# Patient Record
Sex: MALE | Race: WHITE | NOT HISPANIC OR LATINO | Employment: OTHER | URBAN - METROPOLITAN AREA
[De-identification: names, ages, dates, MRNs, and addresses within clinical notes are randomized per-mention and may not be internally consistent; named-entity substitution may affect disease eponyms.]

---

## 2017-09-14 ENCOUNTER — OFFICE VISIT (OUTPATIENT)
Dept: INTERNAL MEDICINE | Facility: CLINIC | Age: 62
End: 2017-09-14
Payer: COMMERCIAL

## 2017-09-14 VITALS
WEIGHT: 188.06 LBS | DIASTOLIC BLOOD PRESSURE: 90 MMHG | HEIGHT: 73 IN | OXYGEN SATURATION: 95 % | SYSTOLIC BLOOD PRESSURE: 122 MMHG | BODY MASS INDEX: 24.92 KG/M2 | HEART RATE: 78 BPM

## 2017-09-14 DIAGNOSIS — J01.40 ACUTE PANSINUSITIS, RECURRENCE NOT SPECIFIED: Primary | ICD-10-CM

## 2017-09-14 DIAGNOSIS — R05.9 COUGH: ICD-10-CM

## 2017-09-14 DIAGNOSIS — R09.81 NASAL CONGESTION: ICD-10-CM

## 2017-09-14 PROCEDURE — 99213 OFFICE O/P EST LOW 20 MIN: CPT | Mod: 25,S$GLB,, | Performed by: NURSE PRACTITIONER

## 2017-09-14 PROCEDURE — 3008F BODY MASS INDEX DOCD: CPT | Mod: S$GLB,,, | Performed by: NURSE PRACTITIONER

## 2017-09-14 PROCEDURE — 96372 THER/PROPH/DIAG INJ SC/IM: CPT | Mod: S$GLB,,, | Performed by: NURSE PRACTITIONER

## 2017-09-14 PROCEDURE — 99999 PR PBB SHADOW E&M-EST. PATIENT-LVL III: CPT | Mod: PBBFAC,,, | Performed by: NURSE PRACTITIONER

## 2017-09-14 RX ORDER — AMOXICILLIN AND CLAVULANATE POTASSIUM 875; 125 MG/1; MG/1
1 TABLET, FILM COATED ORAL EVERY 12 HOURS
Qty: 20 TABLET | Refills: 0 | Status: SHIPPED | OUTPATIENT
Start: 2017-09-14 | End: 2017-09-24

## 2017-09-14 RX ORDER — BENZONATATE 100 MG/1
100 CAPSULE ORAL 3 TIMES DAILY PRN
Qty: 20 CAPSULE | Refills: 0 | Status: SHIPPED | OUTPATIENT
Start: 2017-09-14 | End: 2017-09-24

## 2017-09-14 RX ORDER — BETAMETHASONE SODIUM PHOSPHATE AND BETAMETHASONE ACETATE 3; 3 MG/ML; MG/ML
6 INJECTION, SUSPENSION INTRA-ARTICULAR; INTRALESIONAL; INTRAMUSCULAR; SOFT TISSUE
Status: COMPLETED | OUTPATIENT
Start: 2017-09-14 | End: 2017-09-14

## 2017-09-14 RX ADMIN — BETAMETHASONE SODIUM PHOSPHATE AND BETAMETHASONE ACETATE 6 MG: 3; 3 INJECTION, SUSPENSION INTRA-ARTICULAR; INTRALESIONAL; INTRAMUSCULAR; SOFT TISSUE at 01:09

## 2017-09-14 NOTE — PROGRESS NOTES
INTERNAL MEDICINE PROGRESS/URGENT CARE NOTE    CHIEF COMPLAINT     Chief Complaint   Patient presents with    Cough     dry cough    Hearing Problem     both mostly R. ear        HPI     Marbin Cortes is a 62 y.o. male who presents for an urgent visit today.    2 week h/o ear pressure, headaches, sinus pain and pressure. Dry cough with PND.   +muffled hearing  +runny nose  +congestion   +fatigue  +sore throat  +sob and wheezing   +fatigue     Treating with saline nasal spray with mild relief.         Answers for HPI/ROS submitted by the patient on 9/14/2017   Cough  Chronicity: recurrent  Onset: yesterday  Progression since onset: unchanged  Frequency: every few minutes  Cough characteristics: non-productive  ear congestion: Yes  nasal congestion: Yes  Aggravated by: nothing, dust  asthma: No  bronchiectasis: No  bronchitis: Yes  COPD: No  emphysema: No  pneumonia: No  Treatments tried: OTC cough suppressant, rest  Improvement on treatment: no relief      Past Medical History:  Past Medical History:   Diagnosis Date    GERD (gastroesophageal reflux disease)        Home Medications:  Prior to Admission medications    Medication Sig Start Date End Date Taking? Authorizing Provider   ibuprofen (ADVIL) 200 MG tablet Take 200 mg by mouth every 6 (six) hours as needed for Pain.   Yes Historical Provider, MD   cetirizine (ZYRTEC) 5 MG tablet Take 5 mg by mouth once daily.  9/14/17 Yes Historical Provider, MD   tamsulosin (FLOMAX) 0.4 mg Cp24 Take 1 capsule (0.4 mg total) by mouth once daily. 6/27/16 6/27/17  Seng Winston MD   fluticasone (FLONASE) 50 mcg/actuation nasal spray 2 sprays by Each Nare route once daily. 5/31/16 9/14/17  Ga Valdivia MD   omeprazole (PRILOSEC OTC) 20 MG tablet Take 20 mg by mouth once daily.  9/14/17  Historical Provider, MD       Review of Systems:  Review of Systems   Constitutional: Negative for chills, fatigue and fever.   HENT: Positive for ear pain, hearing loss,  "postnasal drip, rhinorrhea, sinus pain, sinus pressure and sore throat. Negative for congestion and sneezing.    Respiratory: Positive for cough, shortness of breath and wheezing.    Cardiovascular: Negative for chest pain and palpitations.   Gastrointestinal: Negative for abdominal pain, diarrhea, nausea and vomiting.   Allergic/Immunologic: Positive for environmental allergies.       Health Maintainence:   Immunizations:  Health Maintenance       Date Due Completion Date    Hepatitis C Screening 1955 ---    TETANUS VACCINE 08/15/1974 ---    Colonoscopy 08/15/2006 ---    Zoster Vaccine 08/15/2016 ---    Influenza Vaccine 08/01/2017 ---    Lipid Panel 06/03/2021 6/3/2016           PHYSICAL EXAM     BP (!) 122/90 (BP Location: Left arm, Patient Position: Sitting, BP Method: Large (Manual))   Pulse 78   Ht 6' 1" (1.854 m)   Wt 85.3 kg (188 lb 0.8 oz)   SpO2 95%   BMI 24.81 kg/m²     Physical Exam   Constitutional: He is oriented to person, place, and time. He appears well-developed and well-nourished.   HENT:   Head: Normocephalic.   Right Ear: External ear normal. Tympanic membrane is erythematous.   Left Ear: External ear normal. Tympanic membrane is erythematous.   Nose: Mucosal edema present. Right sinus exhibits maxillary sinus tenderness and frontal sinus tenderness. Left sinus exhibits maxillary sinus tenderness and frontal sinus tenderness.   Mouth/Throat: Oropharynx is clear and moist. No oropharyngeal exudate.   Eyes: Pupils are equal, round, and reactive to light.   Neck: No JVD present. No tracheal deviation present. No thyromegaly present.   Cardiovascular: Normal rate, regular rhythm and intact distal pulses.  Exam reveals no gallop and no friction rub.    No murmur heard.  Pulmonary/Chest: Breath sounds normal. No respiratory distress. He has no wheezes. He has no rales. He exhibits no tenderness.   Abdominal: Soft. Bowel sounds are normal. He exhibits no distension. There is no tenderness. "   Musculoskeletal: Normal range of motion. He exhibits no edema or tenderness.   Lymphadenopathy:     He has no cervical adenopathy.   Neurological: He is alert and oriented to person, place, and time.   Skin: Skin is warm and dry. No rash noted.   Psychiatric: He has a normal mood and affect. His behavior is normal.   Vitals reviewed.      LABS     No results found for: LABA1C, HGBA1C  CMP  Sodium   Date Value Ref Range Status   06/03/2016 137 136 - 145 mmol/L Final     Potassium   Date Value Ref Range Status   06/03/2016 4.7 3.5 - 5.1 mmol/L Final     Chloride   Date Value Ref Range Status   06/03/2016 101 95 - 110 mmol/L Final     CO2   Date Value Ref Range Status   06/03/2016 30 (H) 23 - 29 mmol/L Final     Glucose   Date Value Ref Range Status   06/03/2016 106 70 - 110 mg/dL Final     BUN, Bld   Date Value Ref Range Status   06/03/2016 16 6 - 20 mg/dL Final     Creatinine   Date Value Ref Range Status   06/03/2016 1.2 0.5 - 1.4 mg/dL Final     Calcium   Date Value Ref Range Status   06/03/2016 9.5 8.7 - 10.5 mg/dL Final     Anion Gap   Date Value Ref Range Status   06/03/2016 6 (L) 8 - 16 mmol/L Final     eGFR if    Date Value Ref Range Status   06/03/2016 >60.0 >60 mL/min/1.73 m^2 Final     eGFR if non    Date Value Ref Range Status   06/03/2016 >60.0 >60 mL/min/1.73 m^2 Final     Comment:     Calculation used to obtain the estimated glomerular filtration  rate (eGFR) is the CKD-EPI equation. Since race is unknown   in our information system, the eGFR values for   -American and Non--American patients are given   for each creatinine result.       Lab Results   Component Value Date    WBC 6.16 06/03/2016    HGB 15.5 06/03/2016    HCT 46.3 06/03/2016    MCV 86 06/03/2016     06/03/2016     Lab Results   Component Value Date    CHOL 199 06/03/2016     Lab Results   Component Value Date    HDL 43 06/03/2016     Lab Results   Component Value Date    LDLCALC 128.2  06/03/2016     Lab Results   Component Value Date    TRIG 139 06/03/2016     Lab Results   Component Value Date    CHOLHDL 21.6 06/03/2016     No results found for: TSH, C7VKATP, F8PGXQC, THYROIDAB    ASSESSMENT/PLAN     Marbin Cortes is a 62 y.o. male with  Past Medical History:   Diagnosis Date    GERD (gastroesophageal reflux disease)        Acute pansinusitis, recurrence not specified- increase fluids and rest. mucinex ith full glass of water   -     betamethasone acetate-betamethasone sodium phosphate injection 6 mg; Inject 1 mL (6 mg total) into the muscle one time.  -     amoxicillin-clavulanate 875-125mg (AUGMENTIN) 875-125 mg per tablet; Take 1 tablet by mouth every 12 (twelve) hours.  Dispense: 20 tablet; Refill: 0  -     benzonatate (TESSALON) 100 MG capsule; Take 1 capsule (100 mg total) by mouth 3 (three) times daily as needed for Cough.  Dispense: 20 capsule; Refill: 0    Cough  -     benzonatate (TESSALON) 100 MG capsule; Take 1 capsule (100 mg total) by mouth 3 (three) times daily as needed for Cough.  Dispense: 20 capsule; Refill: 0    Nasal congestion  -     betamethasone acetate-betamethasone sodium phosphate injection 6 mg; Inject 1 mL (6 mg total) into the muscle one time.      Follow up as needed     Patient education provided from Anni. Patient was counseled on when and how to seek emergent care.       Dana ESTRADA, APRN, FNP-c   Department of Internal Medicine - [facility]  1:17 PM

## 2017-12-12 ENCOUNTER — TELEPHONE (OUTPATIENT)
Dept: SPINE | Facility: CLINIC | Age: 62
End: 2017-12-12

## 2017-12-12 DIAGNOSIS — M54.50 LOW BACK PAIN WITHOUT SCIATICA, UNSPECIFIED BACK PAIN LATERALITY, UNSPECIFIED CHRONICITY: Primary | ICD-10-CM
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
## 2017-12-13 ENCOUNTER — OFFICE VISIT (OUTPATIENT)
Dept: SPINE | Facility: CLINIC | Age: 62
End: 2017-12-13
Payer: COMMERCIAL

## 2017-12-13 VITALS
HEIGHT: 73 IN | DIASTOLIC BLOOD PRESSURE: 84 MMHG | BODY MASS INDEX: 24.92 KG/M2 | WEIGHT: 188 LBS | HEART RATE: 79 BPM | SYSTOLIC BLOOD PRESSURE: 143 MMHG

## 2017-12-13 DIAGNOSIS — M54.17 THORACIC AND LUMBOSACRAL NEURITIS: ICD-10-CM

## 2017-12-13 DIAGNOSIS — M54.14 THORACIC AND LUMBOSACRAL NEURITIS: ICD-10-CM

## 2017-12-13 DIAGNOSIS — M54.41 ACUTE RIGHT-SIDED LOW BACK PAIN WITH RIGHT-SIDED SCIATICA: ICD-10-CM

## 2017-12-13 DIAGNOSIS — M47.819 SPONDYLOSIS WITHOUT MYELOPATHY: ICD-10-CM

## 2017-12-13 DIAGNOSIS — M51.37 DDD (DEGENERATIVE DISC DISEASE), LUMBOSACRAL: ICD-10-CM

## 2017-12-13 PROCEDURE — 99214 OFFICE O/P EST MOD 30 MIN: CPT | Mod: S$GLB,,, | Performed by: PHYSICIAN ASSISTANT

## 2017-12-13 PROCEDURE — 99999 PR PBB SHADOW E&M-EST. PATIENT-LVL IV: CPT | Mod: PBBFAC,,, | Performed by: PHYSICIAN ASSISTANT

## 2017-12-13 RX ORDER — ACETAMINOPHEN 500 MG
500 TABLET ORAL EVERY 6 HOURS PRN
COMMUNITY

## 2017-12-13 RX ORDER — METHYLPREDNISOLONE 4 MG/1
TABLET ORAL
Qty: 1 PACKAGE | Refills: 0 | Status: SHIPPED | OUTPATIENT
Start: 2017-12-13 | End: 2018-01-03
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
## 2017-12-15 ENCOUNTER — TELEPHONE (OUTPATIENT)
Dept: SPINE | Facility: CLINIC | Age: 62
End: 2017-12-15

## 2017-12-15 NOTE — TELEPHONE ENCOUNTER
----- Message from Liliana Varghese sent at 12/15/2017  9:26 AM CST -----  Contact: Magaly ([facility])  x_  1st Request  _  2nd Request  _  3rd Request    Who: Magaly ([facility])    Why: Magaly would like to speak with staff in reference to the patient's authorization number she states she doesn't see the order for the MRI. She also want's to verify that the MRI is without contrast and not with.     What Number to Call Back: [phone]    When to Expect a call back: (Within 24 hours)    Please return the call at earliest convenience. Thanks!

## 2017-12-15 NOTE — TELEPHONE ENCOUNTER
Called and spoke with Magaly.  Informed her that there is pending authorization with our authorization department.  The MRI is without contrast.

## 2017-12-18 ENCOUNTER — TELEPHONE (OUTPATIENT)
Dept: SPINE | Facility: CLINIC | Age: 62
End: 2017-12-18

## 2017-12-18 NOTE — TELEPHONE ENCOUNTER
----- Message from Liliana Varghese sent at 12/18/2017 10:05 AM CST -----  Contact: MARIELA PHILLIPS [[id number]]  x_  1st Request  _  2nd Request  _  3rd Request    Who: MARIELA PHILLIPS [[id number]]    Why: Patient would like to speak with staff in reference to the methylPREDNISolone (MEDROL, LIZETTE,) medication that was prescribed to him. Pt states that it's not working and feels as if his pain has gotten worse sense. Please call to further discuss and advise.    What Number to Call Back:[phone]    When to Expect a call back: (Within 24 hours)    Please return the call at earliest convenience. Thanks!

## 2017-12-18 NOTE — TELEPHONE ENCOUNTER
Called and spoke with patient.  Verified with Shelly that he was ok to take Tylenol arthritis with the dose pack he is on day 3 and stated that it is not helping much.  He stated that he is now having trouble sleeping.  He will sleep for 2 hours and then be up for an hour. He stated that the leg numbness is bothering him. He stated he wanted to let you know that.

## 2017-12-19 ENCOUNTER — TELEPHONE (OUTPATIENT)
Dept: SPINE | Facility: CLINIC | Age: 62
End: 2017-12-19

## 2017-12-19 ENCOUNTER — TELEPHONE (OUTPATIENT)
Dept: INTERNAL MEDICINE | Facility: CLINIC | Age: 62
End: 2017-12-19

## 2017-12-19 NOTE — TELEPHONE ENCOUNTER
He needs to speak with his PCP about an order for a colonoscopy.    I do not order those in the [facility].    Fouzia Linn, PARRIS, PA-C  Neurosurgery  [facility]  [facility]

## 2017-12-19 NOTE — TELEPHONE ENCOUNTER
----- Message from Chrystal Lowe sent at 12/19/2017  9:07 AM CST -----  Contact: pt   x 1st Request  _ 2nd Request  _ 3rd Request    Who: pt     Why: Pt states that her home stool test was abnormal and is requesting a order for a colonoscopy. Please call and discuss.     What Number to Call Back: [phone]     When to Expect a call back: (Before the end of the day)  -- if call after 3:00 call back will be tomorrow.

## 2017-12-19 NOTE — TELEPHONE ENCOUNTER
Once I see the new MRI I will be able to better determine a plan for him.    He could try gabapentin or lyrica for the leg numbness.    Fouzia Linn, PARRIS, PA-C  Neurosurgery  [facility]  [facility]

## 2017-12-19 NOTE — TELEPHONE ENCOUNTER
Called and left message that the order had been sent and refaxed today.  Patient to call back if anything else is needed.

## 2017-12-19 NOTE — TELEPHONE ENCOUNTER
----- Message from Chrystal Lowe sent at 12/19/2017  9:03 AM CST -----  Contact: pt   X 1st Request  _ 2nd Request  _ 3rd Request    Who: pt     Why: Pt is upset and stating that his MRI order was sent to the wrong facility. Pt states that MRI order is suppose to be sent to [facility] [phone] on [address] [place] [address] . Please call and discuss.     What Number to Call Back: [phone]     When to Expect a call back: (Before the end of the day)  -- if call after 3:00 call back will be tomorrow.

## 2017-12-20 RX ORDER — PREGABALIN 75 MG/1
75 CAPSULE ORAL 2 TIMES DAILY
Qty: 60 CAPSULE | Refills: 2 | Status: SHIPPED | OUTPATIENT
Start: 2017-12-20 | End: 2022-09-01

## 2017-12-20 NOTE — TELEPHONE ENCOUNTER
Called and informed patient that Fouzia suggested medication to help woth the leg numbness.  Patient stated that he tried Gabapentin before but it did not help.  He would like to try the Lyrica.

## 2017-12-20 NOTE — TELEPHONE ENCOUNTER
Phone in [facility] please.    PARRIS Zelaya, PA-C  Neurosurgery  [facility]  [facility]

## 2017-12-21 ENCOUNTER — OFFICE VISIT (OUTPATIENT)
Dept: SPINE | Facility: CLINIC | Age: 62
End: 2017-12-21
Payer: COMMERCIAL

## 2017-12-21 VITALS
WEIGHT: 188 LBS | SYSTOLIC BLOOD PRESSURE: 149 MMHG | HEART RATE: 81 BPM | DIASTOLIC BLOOD PRESSURE: 85 MMHG | BODY MASS INDEX: 24.92 KG/M2 | HEIGHT: 73 IN

## 2017-12-21 DIAGNOSIS — M54.17 THORACIC AND LUMBOSACRAL NEURITIS: ICD-10-CM

## 2017-12-21 DIAGNOSIS — M51.37 DDD (DEGENERATIVE DISC DISEASE), LUMBOSACRAL: ICD-10-CM

## 2017-12-21 DIAGNOSIS — M54.41 ACUTE RIGHT-SIDED LOW BACK PAIN WITH RIGHT-SIDED SCIATICA: ICD-10-CM

## 2017-12-21 DIAGNOSIS — M47.819 SPONDYLOSIS WITHOUT MYELOPATHY: ICD-10-CM

## 2017-12-21 DIAGNOSIS — M54.14 THORACIC AND LUMBOSACRAL NEURITIS: ICD-10-CM

## 2017-12-21 PROCEDURE — 99999 PR PBB SHADOW E&M-EST. PATIENT-LVL III: CPT | Mod: PBBFAC,,, | Performed by: PHYSICIAN ASSISTANT

## 2017-12-21 PROCEDURE — 99214 OFFICE O/P EST MOD 30 MIN: CPT | Mod: S$GLB,,, | Performed by: PHYSICIAN ASSISTANT
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
## 2018-01-14 ENCOUNTER — TELEPHONE (OUTPATIENT)
Dept: INTERNAL MEDICINE | Facility: CLINIC | Age: 63
End: 2018-01-14

## 2018-01-14 DIAGNOSIS — Z00.00 ANNUAL PHYSICAL EXAM: Primary | ICD-10-CM

## 2018-01-14 DIAGNOSIS — N28.1 RENAL CYST: ICD-10-CM

## 2018-01-14 DIAGNOSIS — Z12.5 PROSTATE CANCER SCREENING: ICD-10-CM

## 2018-01-14 NOTE — TELEPHONE ENCOUNTER
See radiology orders and please schedule patient for ordered test - renal ultrasound to follow up on spine service notes. Thank you.    Patient is also due for an annual exam - Please schedule patient for an appointment with me. Thank you.    Please see labs.

## 2018-01-14 NOTE — TELEPHONE ENCOUNTER
----- Message from Fouzia Linn PA-C sent at 12/21/2017 12:50 PM CST -----  Dr. Valdivia,    I just saw Mr. Cortes with an outside MRI and it looks like there is a right kidney cyst.  The radiology report didn't mention anything though.    Just wanted to let you know in case you wanted to do further imaging of his kidneys for further evaluation.    Thanks,  Fouzia Linn, [facility], RENETTA  Neurosurgery  [facility]  [facility]

## 2018-01-23 ENCOUNTER — TELEPHONE (OUTPATIENT)
Dept: INTERNAL MEDICINE | Facility: CLINIC | Age: 63
End: 2018-01-23

## 2018-01-23 NOTE — TELEPHONE ENCOUNTER
Spoke to pt and he stated that he will call at a later date to schedule appointments because his wife just had knee surgery and he needs to care for her.

## 2022-09-01 ENCOUNTER — OFFICE VISIT (OUTPATIENT)
Dept: INTERNAL MEDICINE | Facility: CLINIC | Age: 67
End: 2022-09-01
Payer: MEDICARE

## 2022-09-01 ENCOUNTER — NURSE TRIAGE (OUTPATIENT)
Dept: ADMINISTRATIVE | Facility: CLINIC | Age: 67
End: 2022-09-01
Payer: MEDICARE

## 2022-09-01 VITALS
DIASTOLIC BLOOD PRESSURE: 85 MMHG | HEART RATE: 77 BPM | BODY MASS INDEX: 26.51 KG/M2 | SYSTOLIC BLOOD PRESSURE: 140 MMHG | WEIGHT: 200 LBS | HEIGHT: 73 IN

## 2022-09-01 DIAGNOSIS — S16.1XXA NECK MUSCLE STRAIN, INITIAL ENCOUNTER: Primary | ICD-10-CM

## 2022-09-01 DIAGNOSIS — E66.3 OVERWEIGHT (BMI 25.0-29.9): ICD-10-CM

## 2022-09-01 PROCEDURE — 3077F SYST BP >= 140 MM HG: CPT | Mod: CPTII,S$GLB,, | Performed by: NURSE PRACTITIONER

## 2022-09-01 PROCEDURE — 99214 OFFICE O/P EST MOD 30 MIN: CPT | Mod: S$GLB,,, | Performed by: NURSE PRACTITIONER

## 2022-09-01 PROCEDURE — 1101F PR PT FALLS ASSESS DOC 0-1 FALLS W/OUT INJ PAST YR: ICD-10-PCS | Mod: CPTII,S$GLB,, | Performed by: NURSE PRACTITIONER

## 2022-09-01 PROCEDURE — 1125F PR PAIN SEVERITY QUANTIFIED, PAIN PRESENT: ICD-10-PCS | Mod: CPTII,S$GLB,, | Performed by: NURSE PRACTITIONER

## 2022-09-01 PROCEDURE — 3288F FALL RISK ASSESSMENT DOCD: CPT | Mod: CPTII,S$GLB,, | Performed by: NURSE PRACTITIONER

## 2022-09-01 PROCEDURE — 1160F RVW MEDS BY RX/DR IN RCRD: CPT | Mod: CPTII,S$GLB,, | Performed by: NURSE PRACTITIONER

## 2022-09-01 PROCEDURE — 1160F PR REVIEW ALL MEDS BY PRESCRIBER/CLIN PHARMACIST DOCUMENTED: ICD-10-PCS | Mod: CPTII,S$GLB,, | Performed by: NURSE PRACTITIONER

## 2022-09-01 PROCEDURE — 99999 PR PBB SHADOW E&M-EST. PATIENT-LVL III: CPT | Mod: PBBFAC,,, | Performed by: NURSE PRACTITIONER

## 2022-09-01 PROCEDURE — 1159F MED LIST DOCD IN RCRD: CPT | Mod: CPTII,S$GLB,, | Performed by: NURSE PRACTITIONER

## 2022-09-01 PROCEDURE — 1159F PR MEDICATION LIST DOCUMENTED IN MEDICAL RECORD: ICD-10-PCS | Mod: CPTII,S$GLB,, | Performed by: NURSE PRACTITIONER

## 2022-09-01 PROCEDURE — 99999 PR PBB SHADOW E&M-EST. PATIENT-LVL III: ICD-10-PCS | Mod: PBBFAC,,, | Performed by: NURSE PRACTITIONER

## 2022-09-01 PROCEDURE — 3288F PR FALLS RISK ASSESSMENT DOCUMENTED: ICD-10-PCS | Mod: CPTII,S$GLB,, | Performed by: NURSE PRACTITIONER

## 2022-09-01 PROCEDURE — 3008F BODY MASS INDEX DOCD: CPT | Mod: CPTII,S$GLB,, | Performed by: NURSE PRACTITIONER

## 2022-09-01 PROCEDURE — 99214 PR OFFICE/OUTPT VISIT, EST, LEVL IV, 30-39 MIN: ICD-10-PCS | Mod: S$GLB,,, | Performed by: NURSE PRACTITIONER

## 2022-09-01 PROCEDURE — 3079F PR MOST RECENT DIASTOLIC BLOOD PRESSURE 80-89 MM HG: ICD-10-PCS | Mod: CPTII,S$GLB,, | Performed by: NURSE PRACTITIONER

## 2022-09-01 PROCEDURE — 3008F PR BODY MASS INDEX (BMI) DOCUMENTED: ICD-10-PCS | Mod: CPTII,S$GLB,, | Performed by: NURSE PRACTITIONER

## 2022-09-01 PROCEDURE — 3079F DIAST BP 80-89 MM HG: CPT | Mod: CPTII,S$GLB,, | Performed by: NURSE PRACTITIONER

## 2022-09-01 PROCEDURE — 1125F AMNT PAIN NOTED PAIN PRSNT: CPT | Mod: CPTII,S$GLB,, | Performed by: NURSE PRACTITIONER

## 2022-09-01 PROCEDURE — 1101F PT FALLS ASSESS-DOCD LE1/YR: CPT | Mod: CPTII,S$GLB,, | Performed by: NURSE PRACTITIONER

## 2022-09-01 PROCEDURE — 3077F PR MOST RECENT SYSTOLIC BLOOD PRESSURE >= 140 MM HG: ICD-10-PCS | Mod: CPTII,S$GLB,, | Performed by: NURSE PRACTITIONER

## 2022-09-01 RX ORDER — METHOCARBAMOL 500 MG/1
500 TABLET, FILM COATED ORAL 3 TIMES DAILY PRN
Qty: 30 TABLET | Refills: 0 | Status: SHIPPED | OUTPATIENT
Start: 2022-09-01

## 2022-09-01 NOTE — PATIENT INSTRUCTIONS
Robaxin 500mg every 8 hrs as needed for neck pain, start taking at night with food    Warm compresses as needed a few minutes a day as needed    Continue Tylenol alternating with Ibuprofen as needed for pain

## 2022-09-01 NOTE — TELEPHONE ENCOUNTER
Mr. Cortes is calling this am with complaints of neck pain that began yesterday, radiates down both arms and is constant in nature, rates it 11/10 in severity, denies any numbness/weakness/tingling in his arms/hands.  He states he slept with his head elevated on pillows for the last two nights due to sinus/allergy symptoms, and thinks this was the catalyst for the neck pain.  He took tylenol and ibuprofen yesterday, which were somewhat helpful.  I gave home care advice to help with his symptoms, and arranged an urgent clinic appt for him this am; he verbalized understanding.      Reason for Disposition   SEVERE pain (e.g., excruciating, unable to do any normal activities)    Additional Information   Negative: Shock suspected (e.g., cold/pale/clammy skin, too weak to stand)   Negative: Similar pain previously and it was from 'heart attack'   Negative: Similar pain previously from 'angina' and not relieved by nitroglycerin   Negative: Difficult to awaken or acting confused (e.g., disoriented, slurred speech)   Negative: Sounds like a life-threatening emergency to the triager   Negative: Followed an injury to neck (e.g., MVA, sports, impact or collision)   Negative: Chest pain   Negative: Lymph node in the neck is swollen or painful to the touch   Negative: Sore throat is the main symptom   Negative: Difficulty breathing or unusual sweating (e.g., sweating without exertion)   Negative: Chest pain lasting longer than 5 minutes   Negative: Stiff neck (can't touch chin to chest) and has headache   Negative: Stiff neck (can't touch chin to chest) and fever   Negative: Weakness of an arm or hand   Negative: Problems with bowel or bladder control   Negative: Patient sounds very sick or weak to the triager    Protocols used: Neck Pain or [id number]

## 2022-09-01 NOTE — PROGRESS NOTES
"Subjective:       Patient ID: Marbin Cortes is a 67 y.o. male.    Chief Complaint: Arm Pain and Shoulder Pain    Pt new to me, here for, "neck pain radiating down both arms,new onset, no weakness/numbness/tingling"    Reviewed Nurse Triage note from this morning which states, "Mr. Cortes is calling this am with complaints of neck pain that began yesterday, radiates down both arms and is constant in nature, rates it 11/10 in severity, denies any numbness/weakness/tingling in his arms/hands.  He states he slept with his head elevated on pillows for the last two nights due to sinus/allergy symptoms, and thinks this was the catalyst for the neck pain.  He took tylenol and ibuprofen yesterday, which were somewhat helpful.  I gave home care advice to help with his symptoms, and arranged an urgent clinic appt for him this am; he verbalized understanding."    Started yesterday morning, worsened at night. Pain starts in back of neck and goes into both arms, shoulders and scapulae. Denies injury, trauma, or falls. Does report his front door being old and having to lean into it to close it at times. Took 2 Advils this morning which helped somewhat but did not relieve it all the way.      Neck Pain   This is a new problem. The current episode started yesterday. The problem occurs constantly. The problem has been unchanged. The pain is associated with nothing. The pain is present in the occipital region. The quality of the pain is described as shooting and aching. The pain is at a severity of 10/10. The pain is severe. The symptoms are aggravated by coughing (moving arms makes it hurt). The pain is Worse during the night. Pertinent negatives include no chest pain, fever, headaches, leg pain, numbness, pain with swallowing, paresis, photophobia, syncope, tingling, trouble swallowing, visual change, weakness or weight loss. He has tried NSAIDs and acetaminophen (ibuprofen) for the symptoms. The treatment provided mild " relief.   Review of Systems   Constitutional:  Negative for activity change, appetite change, chills, diaphoresis, fatigue, fever, unexpected weight change and weight loss.   HENT:  Negative for sore throat and trouble swallowing.    Eyes:  Negative for photophobia.   Respiratory:  Negative for chest tightness and shortness of breath.    Cardiovascular:  Negative for chest pain and syncope.   Musculoskeletal:  Positive for arthralgias, neck pain and neck stiffness. Negative for back pain, gait problem, joint swelling, leg pain, myalgias and joint deformity.        As documented in HPI     Allergic/Immunologic: Negative for environmental allergies, food allergies and frequent infections.   Neurological:  Negative for dizziness, tingling, syncope, weakness, light-headedness, numbness and headaches.   Hematological:  Negative for adenopathy. Does not bruise/bleed easily.   Psychiatric/Behavioral:  Negative for suicidal ideas.        Review of patient's allergies indicates:  No Known Allergies    Current Outpatient Medications:     acetaminophen (TYLENOL) 500 MG tablet, Take 500 mg by mouth every 6 (six) hours as needed for Pain., Disp: , Rfl:     ibuprofen (ADVIL,MOTRIN) 200 MG tablet, Take 200 mg by mouth every 6 (six) hours as needed for Pain., Disp: , Rfl:     Patient Active Problem List   Diagnosis    Sciatica of right side     Past Medical History:   Diagnosis Date    GERD (gastroesophageal reflux disease)      Past Surgical History:   Procedure Laterality Date    HERNIA REPAIR      SPINE SURGERY      UPPER GASTROINTESTINAL ENDOSCOPY       Social History     Socioeconomic History    Marital status:     Number of children: 2   Occupational History    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    Physical Exam  Vitals and nursing note reviewed.   Constitutional:       General: He is not in acute distress.     Appearance: Normal appearance. He is not ill-appearing.   HENT:      Head: Normocephalic.      Nose: Nose normal.      Mouth/Throat:      Mouth: Mucous membranes are moist.   Eyes:      Conjunctiva/sclera: Conjunctivae normal.   Cardiovascular:      Rate and Rhythm: Normal rate and regular rhythm.      Heart sounds: Normal heart sounds.   Pulmonary:      Effort: Pulmonary effort is normal.      Breath sounds: Normal breath sounds.   Musculoskeletal:         General: Normal range of motion.      Cervical back: Neck supple. No edema, erythema, signs of trauma, rigidity or crepitus. Pain with movement and muscular tenderness present. No spinous process tenderness.   Skin:     General: Skin is warm and dry.   Neurological:      General: No focal deficit present.      Mental Status: He is alert and oriented to person, place, and time.   Psychiatric:         Mood and Affect: Mood normal.         Behavior: Behavior normal.         Thought Content: Thought content normal.         Judgment: Judgment normal.       Assessment:       Problem List Items Addressed This Visit    None  Visit Diagnoses       Neck muscle strain, initial encounter    -  Primary    Relevant Medications    methocarbamoL (ROBAXIN) 500 MG Tab    BMI 26.0-26.9,adult        Overweight (BMI 25.0-29.9)                Plan:       Marbin was seen today for arm pain and shoulder pain.    Diagnoses and all orders for this visit:    Neck muscle strain, initial encounter  -     " methocarbamoL (ROBAXIN) 500 MG Tab; Take 1 tablet (500 mg total) by mouth 3 (three) times daily as needed (neck pain).    BMI 26.0-26.9,adult  BMI reviewed    Overweight (BMI 25.0-29.9)  BMI reviewed.    Diet and exercise to lose weight.    Robaxin 500mg every 8 hrs as needed for neck pain, start taking at night with food    Warm compresses as needed a few minutes a day as needed    Continue Tylenol alternating with Ibuprofen as needed for pain    Self care instructions provided in AVS    Follow up if symptoms worsen or fail to improve.

## 2025-08-03 ENCOUNTER — HOSPITAL ENCOUNTER (OUTPATIENT)
Facility: HOSPITAL | Age: 70
Discharge: HOME | End: 2025-08-04
Attending: EMERGENCY MEDICINE | Admitting: STUDENT IN AN ORGANIZED HEALTH CARE EDUCATION/TRAINING PROGRAM
Payer: MEDICARE

## 2025-08-03 DIAGNOSIS — N39.0 URINARY TRACT INFECTION WITHOUT HEMATURIA, SITE UNSPECIFIED: ICD-10-CM

## 2025-08-03 DIAGNOSIS — N39.0 URINARY TRACT INFECTION ASSOCIATED WITH CATHETERIZATION OF URINARY TRACT, UNSPECIFIED INDWELLING URINARY CATHETER TYPE, INITIAL ENCOUNTER: ICD-10-CM

## 2025-08-03 DIAGNOSIS — R07.9 CHEST PAIN: ICD-10-CM

## 2025-08-03 DIAGNOSIS — R55 SYNCOPE: ICD-10-CM

## 2025-08-03 DIAGNOSIS — T83.511A URINARY TRACT INFECTION ASSOCIATED WITH CATHETERIZATION OF URINARY TRACT, UNSPECIFIED INDWELLING URINARY CATHETER TYPE, INITIAL ENCOUNTER: ICD-10-CM

## 2025-08-03 DIAGNOSIS — M54.16 LUMBAR RADICULOPATHY: ICD-10-CM
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
## 2025-08-03 DIAGNOSIS — R53.1 WEAKNESS: Primary | ICD-10-CM

## 2025-08-03 DIAGNOSIS — Q62.11 HYDRONEPHROSIS WITH URETEROPELVIC JUNCTION (UPJ) OBSTRUCTION: ICD-10-CM

## 2025-08-03 DIAGNOSIS — N13.30 HYDRONEPHROSIS, UNSPECIFIED HYDRONEPHROSIS TYPE: ICD-10-CM

## 2025-08-03 DIAGNOSIS — N13.5 URETEROPELVIC JUNCTION (UPJ) OBSTRUCTION, LEFT: ICD-10-CM

## 2025-08-03 LAB
ABSOLUTE EOSINOPHIL (OHS): 0.08 K/UL
ABSOLUTE MONOCYTE (OHS): 0.66 K/UL (ref 0.3–1)
ABSOLUTE NEUTROPHIL COUNT (OHS): 5.37 K/UL (ref 1.8–7.7)
ALBUMIN SERPL BCP-MCNC: 3.7 G/DL (ref 3.5–5.2)
ALP SERPL-CCNC: 73 UNIT/L (ref 40–150)
ALT SERPL W/O P-5'-P-CCNC: 47 UNIT/L (ref 0–55)
ANION GAP (OHS): 13 MMOL/L (ref 8–16)
AST SERPL-CCNC: 68 UNIT/L (ref 0–50)
BACTERIA #/AREA URNS AUTO: ABNORMAL /HPF
BASOPHILS # BLD AUTO: 0.04 K/UL
BASOPHILS NFR BLD AUTO: 0.6 %
BILIRUB SERPL-MCNC: 1.1 MG/DL (ref 0.1–1)
BILIRUB UR QL STRIP.AUTO: NEGATIVE
BUN SERPL-MCNC: 17 MG/DL (ref 8–23)
CALCIUM SERPL-MCNC: 9 MG/DL (ref 8.7–10.5)
CHLORIDE SERPL-SCNC: 100 MMOL/L (ref 95–110)
CLARITY UR: ABNORMAL
CO2 SERPL-SCNC: 21 MMOL/L (ref 23–29)
COLOR UR AUTO: YELLOW
CREAT SERPL-MCNC: 1.2 MG/DL (ref 0.5–1.4)
ERYTHROCYTE [DISTWIDTH] IN BLOOD BY AUTOMATED COUNT: 13.6 % (ref 11.5–14.5)
GFR SERPLBLD CREATININE-BSD FMLA CKD-EPI: >60 ML/MIN/1.73/M2
GLUCOSE SERPL-MCNC: 127 MG/DL (ref 70–110)
GLUCOSE UR QL STRIP: NEGATIVE
HCT VFR BLD AUTO: 49 % (ref 40–54)
HCV AB SERPL QL IA: NORMAL
HGB BLD-MCNC: 16.1 GM/DL (ref 14–18)
HGB UR QL STRIP: ABNORMAL
HIV 1+2 AB+HIV1 P24 AG SERPL QL IA: NORMAL
IMM GRANULOCYTES # BLD AUTO: 0.04 K/UL (ref 0–0.04)
IMM GRANULOCYTES NFR BLD AUTO: 0.6 % (ref 0–0.5)
KETONES UR QL STRIP: NEGATIVE
LEUKOCYTE ESTERASE UR QL STRIP: ABNORMAL
LIPASE SERPL-CCNC: 30 U/L (ref 4–60)
LYMPHOCYTES # BLD AUTO: 0.73 K/UL (ref 1–4.8)
MAGNESIUM SERPL-MCNC: 2.2 MG/DL (ref 1.6–2.6)
MCH RBC QN AUTO: 28.5 PG (ref 27–31)
MCHC RBC AUTO-ENTMCNC: 32.9 G/DL (ref 32–36)
MCV RBC AUTO: 87 FL (ref 82–98)
MICROSCOPIC COMMENT: ABNORMAL
NITRITE UR QL STRIP: NEGATIVE
NT-PROBNP SERPL-MCNC: 264 PG/ML
NUCLEATED RBC (/100WBC) (OHS): 0 /100 WBC
PH UR STRIP: 6 [PH]
PLATELET # BLD AUTO: 173 K/UL (ref 150–450)
PMV BLD AUTO: 9.5 FL (ref 9.2–12.9)
POTASSIUM SERPL-SCNC: 4.8 MMOL/L (ref 3.5–5.1)
PROT SERPL-MCNC: 8 GM/DL (ref 6–8.4)
PROT UR QL STRIP: ABNORMAL
RBC # BLD AUTO: 5.65 M/UL (ref 4.6–6.2)
RBC #/AREA URNS AUTO: 9 /HPF (ref 0–4)
RELATIVE EOSINOPHIL (OHS): 1.2 %
RELATIVE LYMPHOCYTE (OHS): 10.5 % (ref 18–48)
RELATIVE MONOCYTE (OHS): 9.5 % (ref 4–15)
RELATIVE NEUTROPHIL (OHS): 77.6 % (ref 38–73)
SODIUM SERPL-SCNC: 134 MMOL/L (ref 136–145)
SP GR UR STRIP: 1.01
SQUAMOUS #/AREA URNS AUTO: <1 /HPF
TROPONIN I SERPL HS-MCNC: 4 NG/L
TSH SERPL-ACNC: 0.74 UIU/ML (ref 0.4–4)
UROBILINOGEN UR STRIP-ACNC: NEGATIVE EU/DL
WBC # BLD AUTO: 6.92 K/UL (ref 3.9–12.7)
WBC #/AREA URNS AUTO: >100 /HPF (ref 0–5)

## 2025-08-03 PROCEDURE — 83880 ASSAY OF NATRIURETIC PEPTIDE: CPT | Performed by: PHYSICIAN ASSISTANT

## 2025-08-03 PROCEDURE — 87154 CUL TYP ID BLD PTHGN 6+ TRGT: CPT | Performed by: PHYSICIAN ASSISTANT

## 2025-08-03 PROCEDURE — 83690 ASSAY OF LIPASE: CPT | Performed by: PHYSICIAN ASSISTANT

## 2025-08-03 PROCEDURE — 84484 ASSAY OF TROPONIN QUANT: CPT | Performed by: PHYSICIAN ASSISTANT

## 2025-08-03 PROCEDURE — 93010 ELECTROCARDIOGRAM REPORT: CPT | Mod: ,,, | Performed by: INTERNAL MEDICINE

## 2025-08-03 PROCEDURE — 25500020 PHARM REV CODE 255: Performed by: EMERGENCY MEDICINE

## 2025-08-03 PROCEDURE — 81001 URINALYSIS AUTO W/SCOPE: CPT | Performed by: PHYSICIAN ASSISTANT

## 2025-08-03 PROCEDURE — 87186 SC STD MICRODIL/AGAR DIL: CPT | Mod: 59 | Performed by: PHYSICIAN ASSISTANT

## 2025-08-03 PROCEDURE — 85025 COMPLETE CBC W/AUTO DIFF WBC: CPT | Performed by: PHYSICIAN ASSISTANT

## 2025-08-03 PROCEDURE — 99285 EMERGENCY DEPT VISIT HI MDM: CPT | Mod: 25

## 2025-08-03 PROCEDURE — 87086 URINE CULTURE/COLONY COUNT: CPT | Performed by: PHYSICIAN ASSISTANT

## 2025-08-03 PROCEDURE — G0378 HOSPITAL OBSERVATION PER HR: HCPCS

## 2025-08-03 PROCEDURE — 86803 HEPATITIS C AB TEST: CPT | Performed by: PHYSICIAN ASSISTANT

## 2025-08-03 PROCEDURE — 63600175 PHARM REV CODE 636 W HCPCS: Performed by: PHYSICIAN ASSISTANT

## 2025-08-03 PROCEDURE — 83735 ASSAY OF MAGNESIUM: CPT | Performed by: PHYSICIAN ASSISTANT

## 2025-08-03 PROCEDURE — 84443 ASSAY THYROID STIM HORMONE: CPT | Performed by: PHYSICIAN ASSISTANT

## 2025-08-03 PROCEDURE — 87389 HIV-1 AG W/HIV-1&-2 AB AG IA: CPT | Performed by: PHYSICIAN ASSISTANT

## 2025-08-03 PROCEDURE — 96361 HYDRATE IV INFUSION ADD-ON: CPT

## 2025-08-03 PROCEDURE — 93005 ELECTROCARDIOGRAM TRACING: CPT

## 2025-08-03 PROCEDURE — 84075 ASSAY ALKALINE PHOSPHATASE: CPT | Performed by: PHYSICIAN ASSISTANT

## 2025-08-03 PROCEDURE — 96374 THER/PROPH/DIAG INJ IV PUSH: CPT | Mod: 59

## 2025-08-03 RX ORDER — IBUPROFEN 200 MG
24 TABLET ORAL
Status: DISCONTINUED | OUTPATIENT
Start: 2025-08-03 | End: 2025-08-04 | Stop reason: HOSPADM

## 2025-08-03 RX ORDER — CEFTRIAXONE 1 G/1
1 INJECTION, POWDER, FOR SOLUTION INTRAMUSCULAR; INTRAVENOUS
Status: COMPLETED | OUTPATIENT
Start: 2025-08-03 | End: 2025-08-03

## 2025-08-03 RX ORDER — ACETAMINOPHEN 325 MG/1
650 TABLET ORAL EVERY 4 HOURS PRN
Status: DISCONTINUED | OUTPATIENT
Start: 2025-08-03 | End: 2025-08-04 | Stop reason: HOSPADM

## 2025-08-03 RX ORDER — CEFTRIAXONE 1 G/1
1 INJECTION, POWDER, FOR SOLUTION INTRAMUSCULAR; INTRAVENOUS
Status: DISCONTINUED | OUTPATIENT
Start: 2025-08-04 | End: 2025-08-04 | Stop reason: HOSPADM

## 2025-08-03 RX ORDER — GLUCAGON 1 MG
1 KIT INJECTION
Status: DISCONTINUED | OUTPATIENT
Start: 2025-08-03 | End: 2025-08-04 | Stop reason: HOSPADM

## 2025-08-03 RX ORDER — SODIUM CHLORIDE 0.9 % (FLUSH) 0.9 %
10 SYRINGE (ML) INJECTION EVERY 12 HOURS PRN
Status: DISCONTINUED | OUTPATIENT
Start: 2025-08-03 | End: 2025-08-04 | Stop reason: HOSPADM

## 2025-08-03 RX ORDER — IBUPROFEN 200 MG
16 TABLET ORAL
Status: DISCONTINUED | OUTPATIENT
Start: 2025-08-03 | End: 2025-08-04 | Stop reason: HOSPADM

## 2025-08-03 RX ORDER — TALC
6 POWDER (GRAM) TOPICAL NIGHTLY PRN
Status: DISCONTINUED | OUTPATIENT
Start: 2025-08-03 | End: 2025-08-04 | Stop reason: HOSPADM

## 2025-08-03 RX ORDER — NALOXONE HCL 0.4 MG/ML
0.02 VIAL (ML) INJECTION
Status: DISCONTINUED | OUTPATIENT
Start: 2025-08-03 | End: 2025-08-04 | Stop reason: HOSPADM

## 2025-08-03 RX ORDER — ONDANSETRON 8 MG/1
8 TABLET, ORALLY DISINTEGRATING ORAL EVERY 8 HOURS PRN
Status: DISCONTINUED | OUTPATIENT
Start: 2025-08-03 | End: 2025-08-04 | Stop reason: HOSPADM

## 2025-08-03 RX ADMIN — SODIUM CHLORIDE, POTASSIUM CHLORIDE, SODIUM LACTATE AND CALCIUM CHLORIDE 1000 ML: 600; 310; 30; 20 INJECTION, SOLUTION INTRAVENOUS at 03:08

## 2025-08-03 RX ADMIN — CEFTRIAXONE SODIUM 1 G: 1 INJECTION, POWDER, FOR SOLUTION INTRAMUSCULAR; INTRAVENOUS at 09:08

## 2025-08-03 RX ADMIN — IOHEXOL 100 ML: 350 INJECTION, SOLUTION INTRAVENOUS at 08:08

## 2025-08-03 NOTE — ED TRIAGE NOTES
"Marbin Cortes, an 69 y.o. male presents to the ED with complaint of weakness happening yesterday getting out of vehicle. Pt states "I can remember everything but my legs gave out and I fell to the ground." Pt denies hitting head and reports not regaining full strength in legs since incident yesterday. Pt denies CP, SOB, N/V/D.     Triage note:  Chief Complaint   Patient presents with    Loss of Consciousness     1 episode yesterday sent from  for cardiac workup.  Denies symptoms       Review of patient's allergies indicates:  No Known Allergies  Past Medical History:   Diagnosis Date    GERD (gastroesophageal reflux disease)       "

## 2025-08-03 NOTE — ED PROVIDER NOTES
"Encounter Date: 8/3/2025       History     Chief Complaint   Patient presents with    Loss of Consciousness     1 episode yesterday sent from  for cardiac workup.  Denies symptoms       The history is provided by the patient and medical records. No  was used.     Marbin Cortes is a 69 y.o. male with medical history of cervical spondylosis, lower back pain presenting to the ED with the chief complaint of fall.    History provided by patient and his wife at bedside. Patient reports falling to the ground while getting out of his car yesterday. He contributes the fall to his legs being weak. Wife reports neighbors saw him on the ground and they were concerned that he had a syncopal episode as he appeared daze. Neighbors helped him back into his house. Wife notes he appeared confused yesterday whenever she arrived back home. Describes confusion had his recollection of the event was different than the neighbors. Wife reports patient is back at his baseline health now. Patient refused to come to the ED yesterday and went to  today who referred him to the ED. Patient reports having chronic back pain and "sciatic" pain down his R leg. He has history of L-spine surgery 30 years ago at [facility]. Reports intermittent weakness in his R leg and occasionally needs to use a cane. He reports his RLE weakness is worse today and also reports his is having new weakness in his left leg. No b/b dysfunction. Denies headache, chest pain, SOB, abdominal pain, vomiting, dysuria, hematuria. Wife was concerned he maybe dehydrated and he has been drinking a lot more water since the event. No daily medications.       Review of patient's allergies indicates:  No Known Allergies  Past Medical History:   Diagnosis Date    GERD (gastroesophageal reflux disease)      Past Surgical History:   Procedure Laterality Date    HERNIA REPAIR      SPINE SURGERY      UPPER GASTROINTESTINAL ENDOSCOPY       Family History "   Problem Relation Name Age of Onset    Stroke Mother      Diabetes Mother      Stroke Father      Cancer Paternal Grandmother      Liver disease Paternal Grandmother      Colon cancer Neg Hx      Stomach cancer Neg Hx       Social History[1]  Review of Systems   Constitutional:  Negative for fever.       Physical Exam     Initial Vitals [08/03/25 1341]   BP Pulse Resp Temp SpO2   123/77 109 18 98.1 °F (36.7 °C) 96 %      MAP       --         Physical Exam    Constitutional: He appears well-developed and well-nourished. He is not diaphoretic. He is easily aroused.   HENT:   Head: Normocephalic and atraumatic. Mouth/Throat: Oropharynx is clear and moist. No oropharyngeal exudate.   Eyes: EOM and lids are normal. Pupils are equal, round, and reactive to light. No scleral icterus.   Neck: Phonation normal. Neck supple. No stridor present.   Normal range of motion.  Cardiovascular:  Normal rate and regular rhythm.           +2 DP pulses   Pulmonary/Chest: Breath sounds normal. No stridor. No respiratory distress. He has no wheezes. He has no rales.   Abdominal: Abdomen is soft. He exhibits no distension. There is no abdominal tenderness. There is no rebound.   Musculoskeletal:         General: No tenderness or edema. Normal range of motion.      Cervical back: Normal range of motion and neck supple.     Neurological: He is alert, oriented to person, place, and time and easily aroused. He has normal strength. No sensory deficit.   Mild weakness noted to R dorsiflexion and plantarflexion  Mild weakness noted to L hip flexion  No sensation deficit     Skin: Skin is warm and dry. No rash noted. No erythema.   Psychiatric: He has a normal mood and affect. His speech is normal.         ED Course   Procedures  Labs Reviewed   COMPREHENSIVE METABOLIC PANEL - Abnormal       Result Value    Sodium 134 (*)     Potassium 4.8      Chloride 100      CO2 21 (*)     Glucose 127 (*)     BUN 17      Creatinine 1.2      Calcium 9.0       Protein Total 8.0      Albumin 3.7      Bilirubin Total 1.1 (*)     ALP 73      AST 68 (*)     ALT 47      Anion Gap 13      eGFR >60     URINALYSIS, REFLEX TO URINE CULTURE - Abnormal    Color, UA Yellow      Appearance, UA Hazy (*)     pH, UA 6.0      Spec Grav UA 1.015      Protein, UA Trace (*)     Glucose, UA Negative      Ketones, UA Negative      Bilirubin, UA Negative      Blood, UA 1+ (*)     Nitrites, UA Negative      Urobilinogen, UA Negative      Leukocyte Esterase, UA 3+ (*)    CBC WITH DIFFERENTIAL - Abnormal    WBC 6.92      RBC 5.65      HGB 16.1      HCT 49.0      MCV 87      MCH 28.5      MCHC 32.9      RDW 13.6      Platelet Count 173      MPV 9.5      Nucleated RBC 0      Neut % 77.6 (*)     Lymph % 10.5 (*)     Mono % 9.5      Eos % 1.2      Basophil % 0.6      Imm Grans % 0.6 (*)     Neut # 5.37      Lymph # 0.73 (*)     Mono # 0.66      Eos # 0.08      Baso # 0.04      Imm Grans # 0.04     URINALYSIS MICROSCOPIC - Abnormal    RBC, UA 9 (*)     WBC, UA >100 (*)     Bacteria, UA Many (*)     Squamous Epithelial Cells, UA <1      Microscopic Comment       HEPATITIS C ANTIBODY - Normal    Hep C Ab Interp Non-Reactive     HIV 1 / 2 ANTIBODY - Normal    HIV 1/2 Ag/Ab Non-Reactive     LIPASE - Normal    Lipase Level 30     MAGNESIUM - Normal    Magnesium  2.2     TSH - Normal    TSH 0.744     TROPONIN I HIGH SENSITIVITY - Normal    Troponin High Sensitive 4     NT-PRO NATRIURETIC PEPTIDE - Normal    NT-proBNP 264      Narrative:     NOTE:  Access complete set of age - and/or gender-specific reference intervals for this test in the [facility] Collection Manual.   CULTURE, URINE   CULTURE, BLOOD   CULTURE, BLOOD   CBC W/ AUTO DIFFERENTIAL    Narrative:     The following orders were created for panel order CBC auto differential.  Procedure                               Abnormality         Status                     ---------                               -----------         ------                      CBC with Differential[[id number]]        Abnormal            Final result                 Please view results for these tests on the individual orders.   HEP C VIRUS HOLD SPECIMEN   GREY TOP URINE HOLD     EKG Readings: (Independently Interpreted)   Sinus tachycardia 107 bpm. L axis deviation. S1Q3T3 morphology       Imaging Results              CT Abdomen Pelvis With IV Contrast NO Oral Contrast (Final result)  Result time 08/03/25 20:40:08      Final result by Lupillo Membreno MD (08/03/25 20:40:08)                   Impression:      Isolated hydronephrosis of the left kidney terminating at what appears to be UPJ obstruction.  Correlate for stricture, acquired or congenital.    Urology follow-up may be beneficial.    Extensive diverticulosis mainly in the descending and sigmoid colon without features of diverticulitis.      Electronically signed by: Lupillo Membreno  Date:    08/03/2025  Time:    20:40               Narrative:    EXAMINATION:  CT ABDOMEN PELVIS WITH IV CONTRAST    CLINICAL HISTORY:  Flank pain, kidney stone suspected;Abdominal abscess/infection suspected;Severe hydronephro incidentally seen on MRI;    TECHNIQUE:  Low dose axial images, sagittal and coronal reformations were obtained from the lung bases to the pubic symphysis following the IV administration of 100 mL of Omnipaque 350 .  Oral contrast was not given.    COMPARISON:  MRI lumbar spine, 08/03/2025 at 16:31 hours    FINDINGS:  Heart: Normal in size. No pericardial effusion.    Lung Bases: Well aerated, without consolidation or pleural fluid.    Liver: Normal in size and attenuation, with no focal hepatic solid lesions.  Subcentimeter low-density lesions likely cysts too small to characterize.    Gallbladder: No calcified gallstones.    Bile Ducts: No evidence of dilated ducts.    Pancreas: No mass or peripancreatic fat stranding.    Spleen: Unremarkable.    Adrenals: Unremarkable.    Kidneys/ Ureters: Marked hydronephrosis and  enlarged extrarenal pelvis of the left kidney with focal transition to the proximal ureter at the UPJ raising the question of UPJ stricture (best seen on series 602, image 89) no evidence of stone.  Lower pole 4 Hounsfield units cyst medial right kidney.    Bladder: No evidence of wall thickening.    Reproductive organs: Unremarkable.    GI Tract/Mesentery: Extensive colonic diverticulosis mainly in the sigmoid region without evidence of diverticulitis obstruction or impaction.    Peritoneal Space: No ascites. No free air.    Retroperitoneum: No significant adenopathy.    Abdominal wall: Unremarkable.    Vasculature: No significant atherosclerosis or aneurysm.    Bones: No acute fracture.                                       CT Head Without Contrast (Final result)  Result time 08/03/25 17:24:27      Final result by Alex Reyes MD (08/03/25 17:24:27)                   Impression:      No acute intracranial pathology.    No displaced calvarial fracture or scalp hematoma.    Prominence of the ventricular system, slightly increased when compared with CT head 11/10/2014.  No evidence of acute hydrocephalus, although normal pressure hydrocephalus may be of consideration in the appropriate clinical context.    Electronically signed by resident: Elton Penn  Date:    08/03/2025  Time:    17:00    Electronically signed by: Alex Reyes MD  Date:    08/03/2025  Time:    17:24               Narrative:    EXAMINATION:  CT HEAD WITHOUT CONTRAST    CLINICAL HISTORY:  Head trauma, minor (Age >= 65y);    TECHNIQUE:  Low dose axial CT images obtained throughout the head without the use of intravenous contrast.  Axial, sagittal and coronal reconstructions were performed.    COMPARISON:  CT head 11/10/2014.    FINDINGS:  Generalized cerebral volume loss with widening sulci and enlargement ventricles.  Ventricles remain prominent, although mildly progressed when compared with CT 11/10/2014.  No evidence of periventricular  hypoattenuation to suggest transependymal edema or hydrocephalus.  Basal cisterns are patent.    No hemorrhage, edema, or acute major vascular distribution infarct.  No mass effect or midline shift.    No extra-axial blood or fluid collections.  Prominent bifrontal and posterior fossa subarachnoid spaces, unchanged.    No displaced calvarial fracture.  No scalp contusion.    Left maxillary mucous retention cyst.  Remaining paranasal sinuses and mastoid air cells are essentially clear.                                        MRI Lumbar Spine Without Contrast (Final result)  Result time 08/03/25 17:03:40      Final result by Alex Reyes MD (08/03/25 17:03:40)                   Impression:      Remote operative changes at L5-S1.  No significant canal stenosis at any level.  Mild neural foraminal narrowing at L4-S1.    Partially imaged severe left hydronephrosis.  Recommend clinical correlation.  CT abdomen pelvis may be beneficial for further evaluation.    This report was flagged in Epic as abnormal.    Electronically signed by resident: David Dubon  Date:    08/03/2025  Time:    16:51    Electronically signed by: Alex Reyes MD  Date:    08/03/2025  Time:    17:03               Narrative:    EXAMINATION:  MRI LUMBAR SPINE WITHOUT CONTRAST    CLINICAL HISTORY:  Spinal stenosis, lumbar;    TECHNIQUE:  Multiplanar, multisequence MR images were acquired from the thoracolumbar junction to the sacrum without contrast.    COMPARISON:  Lumbar spine radiograph 06/02/2016.    FINDINGS:  Remote operative changes noted at L5-S1 with associated susceptibility weighted artifact within the subcutaneous tissues.    Alignment: Normal. No spondylolysis.    Vertebrae: No compression fracture or marrow infiltrative process. Benign osseous hemangiomas at L3 and L5.  Schmorl's node in the inferior endplate of L4.    Discs: Disc desiccation and height loss at L4-L5 and L5-S1.  Posterior annular fissure at L5-S1.    Cord: Conus  terminates at L1 and appears unremarkable.  Cauda equina appears unremarkable.    Paraspinal muscles & soft tissues: Mild atrophy of the inferior paraspinal musculature.  Severe left hydronephrosis and dilatation of the proximal left ureter.  Right renal cysts.    Degenerative findings:    T12-L1: No spinal canal neural foraminal stenosis.    L1-L2: No spinal canal or neural foraminal stenosis.    L2-L3: No spinal canal or neural foraminal stenosis.    L3-L4: No spinal canal or neural foraminal stenosis.    L4-L5: Small circumferential disc bulge and bilateral facet arthropathy result in mild bilateral neural foraminal narrowing.  No spinal canal stenosis.    L5-S1: Operative level.  Bilateral facet arthropathy and mild bilateral neural foraminal narrowing.  No spinal canal stenosis.                                       X-Ray Chest AP Portable (Final result)  Result time 08/03/25 16:08:50      Final result by Ryne Coleman MD (08/03/25 16:08:50)                   Impression:      1. Interstitial findings are accentuated by shallow inspiratory effort, no large focal consolidation.      Electronically signed by: Ryne Coleman MD  Date:    08/03/2025  Time:    16:08               Narrative:    EXAMINATION:  XR CHEST AP PORTABLE    CLINICAL HISTORY:  Unspecified fall, initial encounter    TECHNIQUE:  Single frontal view of the chest was performed.    COMPARISON:  04/22/2013    FINDINGS:  The cardiomediastinal silhouette is not enlarged noting calcification of the aorta..  There is no pleural effusion.  The trachea is midline.  The lungs are symmetrically expanded bilaterally with coarse interstitial attenuation, accentuated by shallow inspiratory effort..  No large focal consolidation seen.  There is no pneumothorax.  The osseous structures are remarkable for mild degenerative change..                                       Medications   lactated ringers bolus 1,000 mL (0 mLs Intravenous Stopped 8/3/25 1937)    iohexoL (OMNIPAQUE 350) injection 100 mL (100 mLs Intravenous Given 8/3/25 2006)   cefTRIAXone injection 1 g (1 g Intravenous Given 8/3/25 2111)     Medical Decision Making  69 y.o. male with medical history of cervical spondylosis, lower back pain presenting to the ED c/o ?syncopal episode with a fall yesterday while getting out of his car. Wife was concerned for confusion after the episode which has since improved. Additionally reporting lower back pain with worsening weakness in his RLE and new weakness in his L leg.     DDx broad and includes but not limited to orthostatic hypotension, dehydration, cardiac arrhythmia, electrolyte disturbance, symptomatic anemia, viral syndrome, pneumonia, physical deconditioning, medication side effect, thyroid disease, UTI, cauda equina, lumbar stenosis, post-operative complication, traumatic brain injury, brain mas.       Amount and/or Complexity of Data Reviewed  Labs: ordered. Decision-making details documented in ED Course.  Radiology: ordered and independent interpretation performed.  ECG/medicine tests: ordered and independent interpretation performed.    Risk  Prescription drug management.               ED Course as of 08/03/25 2200   Sun Aug 03, 2025   1737 WBC: 6.92 [BA]   1737 Hemoglobin: 16.1 [BA]   1737 Hematocrit: 49.0 [BA]   1737 Troponin I High Sensitivity: 4 [BA]   1737 Creatinine: 1.2 [BA]   1737 CT head with no acute traumatic findings. Incidental prominence of the ventricular system, slightly increased from prior. No headache and lower suspicion for acute hydrocephalus.  [BA]   1738 CXR showing coarse interstitial attenuation. No focal consolidations. [BA]   1738 MRI showing remote operative changes at L5-S1.  No significant canal stenosis at any level.  Mild neural foraminal narrowing at L4-S1.     Incidental severe L hydronephrosis noted on MRI L-spine. Discussed this patient the patient and his wife. Denies flank pain or abdominal pain. No hematuria.  Denies being told he had hydronephrosis in the past and denies history of kidney stones. Will check CT ab/pelv for further evaluation. UA pending. [BA]   2148 UA with elevated inflammatory markers. Notes urinary frequency in the setting of increased PO intake. No dysuria. [BA]   2148 CT showing isolated hydronephrosis of the left kidney terminating at what appears to be UPJ obstruction.  Correlate for stricture, acquired or congenital.    Discussed with Urology who will come the patient. Rocephin ordered for UTI coverage. Discussed with HM, placed in obs for ongoing management. Patient expresses understanding and agreeable to the plan. I have discussed the care of this patient with my supervising physician.  [BA]      ED Course User Index  [BA] Alfredito Sanchez PA-C                               Clinical Impression:  Final diagnoses:  [R55] Syncope (Primary)  [W19.XXXA] Fall  [Q62.11] Hydronephrosis with ureteropelvic junction (UPJ) obstruction  [N39.0] Urinary tract infection without hematuria, site unspecified  [M54.16] Lumbar radiculopathy                       [1]   Social History  Tobacco Use    Smoking status: Never    Smokeless tobacco: Never   Substance Use Topics    Alcohol use: No    Drug use: No        Alfredito Sanchez PA-C  08/03/25 2202

## 2025-08-04 VITALS
TEMPERATURE: 98 F | SYSTOLIC BLOOD PRESSURE: 128 MMHG | HEIGHT: 73 IN | RESPIRATION RATE: 17 BRPM | HEART RATE: 74 BPM | BODY MASS INDEX: 26.12 KG/M2 | OXYGEN SATURATION: 97 % | DIASTOLIC BLOOD PRESSURE: 82 MMHG | WEIGHT: 197.06 LBS

## 2025-08-04 PROBLEM — N13.5 URETEROPELVIC JUNCTION (UPJ) OBSTRUCTION, LEFT: Status: ACTIVE | Noted: 2025-08-04

## 2025-08-04 LAB
ABSOLUTE EOSINOPHIL (OHS): 0.1 K/UL
ABSOLUTE MONOCYTE (OHS): 0.73 K/UL (ref 0.3–1)
ABSOLUTE NEUTROPHIL COUNT (OHS): 3.35 K/UL (ref 1.8–7.7)
ACB COMPLEX DNA BLD POS QL NAA+NON-PROBE: NOT DETECTED
ALBUMIN SERPL BCP-MCNC: 2.9 G/DL (ref 3.5–5.2)
ALP SERPL-CCNC: 59 UNIT/L (ref 40–150)
ALT SERPL W/O P-5'-P-CCNC: 21 UNIT/L (ref 0–55)
ANION GAP (OHS): 10 MMOL/L (ref 8–16)
AST SERPL-CCNC: 39 UNIT/L (ref 0–50)
B FRAGILIS DNA BLD POS QL NAA+PROBE: NOT DETECTED
BASOPHILS # BLD AUTO: 0.03 K/UL
BASOPHILS NFR BLD AUTO: 0.6 %
BILIRUB SERPL-MCNC: 0.7 MG/DL (ref 0.1–1)
BUN SERPL-MCNC: 16 MG/DL (ref 8–23)
C ALBICANS DNA BLD POS QL NAA+PROBE: NOT DETECTED
C AURIS DNA BLD POS QL NAA+NON-PROBE: NOT DETECTED
C GATTII+NEOFOR DNA CSF QL NAA+NON-PROBE: NOT DETECTED
C GLABRATA DNA BLD POS QL NAA+PROBE: NOT DETECTED
C KRUSEI DNA BLD POS QL NAA+PROBE: NOT DETECTED
C PARAP DNA BLD POS QL NAA+PROBE: NOT DETECTED
C TROPICLS DNA BLD POS QL NAA+PROBE: NOT DETECTED
CALCIUM SERPL-MCNC: 8.2 MG/DL (ref 8.7–10.5)
CHLORIDE SERPL-SCNC: 101 MMOL/L (ref 95–110)
CO2 SERPL-SCNC: 22 MMOL/L (ref 23–29)
COLISTIN RES MCR-1 ISLT/SPM QL: ABNORMAL
CREAT SERPL-MCNC: 1.1 MG/DL (ref 0.5–1.4)
E CLOAC COMP DNA BLD POS QL NAA+PROBE: NOT DETECTED
E COLI DNA BLD POS QL NAA+PROBE: NOT DETECTED
E FAECALIS+OTHR E SP RRNA BLD POS FISH: NOT DETECTED
E FAECIUM HSP60 BLD POS QL PROBE: NOT DETECTED
ENTEROBACTERALES DNA BLD POS NAA+N-PRB: NOT DETECTED
ERYTHROCYTE [DISTWIDTH] IN BLOOD BY AUTOMATED COUNT: 13.5 % (ref 11.5–14.5)
ESBL CFT TO CFT-CLAV IC RTO BD POS IMP: ABNORMAL
GFR SERPLBLD CREATININE-BSD FMLA CKD-EPI: >60 ML/MIN/1.73/M2
GLUCOSE SERPL-MCNC: 107 MG/DL (ref 70–110)
GP B STREP DNA CSF QL NAA+NON-PROBE: NOT DETECTED
HAEM INFLU DNA CSF QL NAA+NON-PROBE: NOT DETECTED
HCT VFR BLD AUTO: 41.3 % (ref 40–54)
HGB BLD-MCNC: 13.8 GM/DL (ref 14–18)
HOLD SPECIMEN: NORMAL
IMM GRANULOCYTES # BLD AUTO: 0.04 K/UL (ref 0–0.04)
IMM GRANULOCYTES NFR BLD AUTO: 0.8 % (ref 0–0.5)
IMP CARBAPENEMASE ISLT QL IA.RAPID: ABNORMAL
K OXYTOCA OMPA BLD POS QL PROBE: NOT DETECTED
KLEBSIELLA SP DNA BLD POS QL NAA+NON-PRB: NOT DETECTED
KLEBSIELLA SP DNA BLD POS QL NAA+NON-PRB: NOT DETECTED
KPC CARBAPENEMASE ISLT QL IA.RAPID: ABNORMAL
L MONOCYTOG DNA CSF QL NAA+NON-PROBE: NOT DETECTED
LYMPHOCYTES # BLD AUTO: 1.06 K/UL (ref 1–4.8)
MAGNESIUM SERPL-MCNC: 2 MG/DL (ref 1.6–2.6)
MCH RBC QN AUTO: 28.9 PG (ref 27–31)
MCHC RBC AUTO-ENTMCNC: 33.4 G/DL (ref 32–36)
MCV RBC AUTO: 87 FL (ref 82–98)
MECA+MECC NOSE QL NAA+PROBE: ABNORMAL
MECA+MECC+MREJ ISLT/SPM QL: ABNORMAL
N MEN DNA CSF QL NAA+NON-PROBE: NOT DETECTED
NDM CARBAPENEMASE ISLT QL IA.RAPID: ABNORMAL
NUCLEATED RBC (/100WBC) (OHS): 0 /100 WBC
OHS QRS DURATION: 92 MS
OHS QTC CALCULATION: 416 MS
OXA-48-LIKE CRBPNASE ISLT QL IA.RAPID: ABNORMAL
P AERUGINOSA DNA BLD POS QL NAA+PROBE: NOT DETECTED
PHOSPHATE SERPL-MCNC: 2.5 MG/DL (ref 2.7–4.5)
PLATELET # BLD AUTO: 165 K/UL (ref 150–450)
PMV BLD AUTO: 10.1 FL (ref 9.2–12.9)
POTASSIUM SERPL-SCNC: 3.5 MMOL/L (ref 3.5–5.1)
PROT SERPL-MCNC: 6.3 GM/DL (ref 6–8.4)
PROTEUS SP DNA BLD POS QL NAA+PROBE: NOT DETECTED
RBC # BLD AUTO: 4.77 M/UL (ref 4.6–6.2)
RELATIVE EOSINOPHIL (OHS): 1.9 %
RELATIVE LYMPHOCYTE (OHS): 20 % (ref 18–48)
RELATIVE MONOCYTE (OHS): 13.7 % (ref 4–15)
RELATIVE NEUTROPHIL (OHS): 63 % (ref 38–73)
S AUREUS DNA BLD POS QL NAA+PROBE: NOT DETECTED
S ENT+BONG DNA STL QL NAA+NON-PROBE: NOT DETECTED
S EPIDERMIDIS HSP60 BLD POS QL PROBE: NOT DETECTED
S LUGDUNENSIS SODA BLD POS QL PROBE: NOT DETECTED
S MALTOPH DNA BLD POS QL NAA+PROBE: NOT DETECTED
S MARCESCENS DNA BLD POS QL NAA+PROBE: NOT DETECTED
S PNEUM DNA CSF QL NAA+NON-PROBE: NOT DETECTED
S PYOGENES HSP60 BLD POS QL PROBE: NOT DETECTED
SODIUM SERPL-SCNC: 133 MMOL/L (ref 136–145)
STAPH SP TUF BLD POS QL PROBE: DETECTED
STREPTOCOCCUS SP TUF BLD POS QL PROBE: NOT DETECTED
VAN(A+B+C1+C2) GENES ISLT/SPM: ABNORMAL
VIM CARBAPENEMASE ISLT QL IA.RAPID: ABNORMAL
WBC # BLD AUTO: 5.31 K/UL (ref 3.9–12.7)

## 2025-08-04 PROCEDURE — 87086 URINE CULTURE/COLONY COUNT: CPT

## 2025-08-04 PROCEDURE — 80053 COMPREHEN METABOLIC PANEL: CPT

## 2025-08-04 PROCEDURE — 36415 COLL VENOUS BLD VENIPUNCTURE: CPT

## 2025-08-04 PROCEDURE — 84100 ASSAY OF PHOSPHORUS: CPT

## 2025-08-04 PROCEDURE — G0378 HOSPITAL OBSERVATION PER HR: HCPCS

## 2025-08-04 PROCEDURE — 83735 ASSAY OF MAGNESIUM: CPT

## 2025-08-04 PROCEDURE — 85025 COMPLETE CBC W/AUTO DIFF WBC: CPT

## 2025-08-04 RX ORDER — CIPROFLOXACIN 500 MG/1
500 TABLET, FILM COATED ORAL 2 TIMES DAILY
Qty: 20 TABLET | Refills: 0 | Status: SHIPPED | OUTPATIENT
Start: 2025-08-04 | End: 2025-08-14

## 2025-08-04 NOTE — ASSESSMENT & PLAN NOTE
- hydronephrosis incidentally noted on MRI  - CT abd/pel: Isolated hydronephrosis of the left kidney terminating at what appears to be UPJ obstruction   - Urology consulted, appreciate assistance  - given no renal insufficiency, urology recommend outpt f/u with lasix renal scan

## 2025-08-04 NOTE — PLAN OF CARE
[facility]  Initial Discharge Assessment       Primary Care Provider: No, Primary Doctor  Pt [place] using [place]. [place] [facility] [place] needed but provider has not had open appointments recently.    Admission Diagnosis: Lumbar radiculopathy [M54.16]  Syncope [R55]  Fall [W19.XXXA]  Chest pain [R07.9]  Urinary tract infection without hematuria, site unspecified [N39.0]  Hydronephrosis with ureteropelvic junction (UPJ) obstruction [Q62.11]    Admission Date: 8/3/2025  Expected Discharge Date: 8/4/2025    Transition of Care Barriers: (P) None    Payor: HU[employer] MANAGED MEDICARE / Plan: HU[employer] MEDICARE PPO / Product Type: Medicare Advantage /     Extended Emergency Contact Information  Primary Emergency Contact: Kateryna Cortes  Address: [address] [place], [place] [address] [facility]  Mobile Phone: [phone]  Relation: Spouse    Discharge Plan A: (P) Home, Home with family  Discharge Plan B: (P) Home with family, Home Health    Preferred Pharmacy: [facility] [address] [place], [place] [address] [facility]  [address] [facility] [address]  Phone: [phone] Fax: [phone]      Initial Assessment (most recent)       Adult Discharge Assessment - 08/04/25 0953          Discharge Assessment    Assessment Type Discharge Planning Assessment     Confirmed/corrected address, phone number and insurance Yes     Confirmed Demographics Correct on Facesheet     Source of Information patient     Communicated BRANDON with patient/caregiver Yes     People in Home spouse     Name(s) of People in Home Kateryna Cortes     Facility Arrived From: Pt arrived from home via personal transportation     Do you expect to return to your current living situation? Yes     Do you have help at home or someone to help you manage your care at home? No     Prior to hospitilization cognitive status: Unable to Assess     Current cognitive status: Alert/Oriented     Walking or Climbing  Stairs Difficulty yes     Walking or Climbing Stairs ambulation difficulty, requires equipment     Mobility Management Pt uses a cane for ambulation     Dressing/Bathing Difficulty no     Home Accessibility stairs to enter home     Number of Stairs, Main Entrance four     Home Layout Able to live on 1st floor     Equipment Currently Used at Home cane, straight     Readmission within 30 days? No     Patient currently being followed by outpatient case management? No     Do you currently have service(s) that help you manage your care at home? No (P)      Do you take prescription medications? No (P)      Do you have prescription coverage? Yes (P)      Do you have any problems affording any of your prescribed medications? No (P)      Who is going to help you get home at discharge? Pt's spouse will provide transportation home at the time of d/c. (P)      How do you get to doctors appointments? car, drives self (P)      Are you on dialysis? No (P)      Do you take coumadin? No (P)      Discharge Plan A Home;Home with family (P)      Discharge Plan B Home with family;Home Health (P)      DME Needed Upon Discharge  none (P)      Discharge Plan discussed with: Patient (P)      Transition of Care Barriers None (P)         Physical Activity    On average, how many days per week do you engage in moderate to strenuous exercise (like a brisk walk)? 7 days (P)      On average, how many minutes do you engage in exercise at this level? 30 min (P)         Financial Resource Strain    How hard is it for you to pay for the very basics like food, housing, medical care, and heating? Not hard at all (P)         Housing Stability    In the last 12 months, was there a time when you were not able to pay the mortgage or rent on time? No (P)      At any time in the past 12 months, were you homeless or living in a shelter (including now)? No (P)         Transportation Needs    In the past 12 months, has lack of transportation kept you from  medical appointments or from getting medications? No (P)      In the past 12 months, has lack of transportation kept you from meetings, work, or from getting things needed for daily living? No (P)         Food Insecurity    Within the past 12 months, you worried that your food would run out before you got the money to buy more. Never true (P)      Within the past 12 months, the food you bought just didn't last and you didn't have money to get more. Never true (P)         Social Isolation    How often do you feel lonely or isolated from those around you?  Never (P)         Alcohol Use    Q1: How often do you have a drink containing alcohol? Never (P)      Q2: How many drinks containing alcohol do you have on a typical day when you are drinking? Patient does not drink (P)      Q3: How often do you have six or more drinks on one occasion? Never (P)         Utilities    In the past 12 months has the electric, gas, oil, or water company threatened to shut off services in your home? No (P)         Health Literacy    How often do you need to have someone help you when you read instructions, pamphlets, or other written material from your doctor or pharmacy? Never (P)                    Discharge Plan A and Plan B have been determined by review of patient's clinical status, future medical and therapeutic needs, and coverage/benefits for post-acute care in coordination with multidisciplinary team members.     spoke to pt at the bedside. Pt verified all information. Pt arrived to the hospital from home via personal transportation after visiting urgent care and being told to present to ED. Pt lives at home with their spouse Kateryna. Pt is independent with their ADL's such as bathing/dressing and ambulates with the use of a cane. Pt is not on dialysis/coumidin/or using DME other than straight cane or ambulation. Pt received d/c planning booklet and SW wrote name/# on whiteboard in pt room. SW will continue to follow  for additional d/c needs.    Pt's spouse will provide transportation home at the time of d/c.     SW spoke to pt about post-acute HH needs and pt stated they are not interested at this time.     Met with pt at bedside to review discharge recommendation of home health and is agreeable to plan.    Patient/family provided list of facilities in-network with patient's payor plan. Providers that are owned, operated, or affiliated with [facility] are included on the list.     Notified that referral sent to below listed facilities from in-network list based on proximity to home/family support:   [facility] -   2. [facility] - denied; unable to meet care needs  3. [facility] -   4. [facility] -   5. [facility] -   6. [facility] -   7. [facility] - denied; out of network with ins  8. [facility] - considering; need clinical review  9. [facility] - denied; inadequate staffing  10. [facility] -   11. [facility] -   12. [facility] -   13. [facility] -   14. [facility] - accepted  15. [facility] - declined; cannot accept this payor at this time  16. [facility] -   [facility]. [facility] -   18. [facility] -     Patient/family instructed to identify preference.    Preferred Facility: (if more than 1, listed in order of descending preference)    Patient has declined to select a preferred provider and elects placement with the first accepting in network provider that is available to provide services as ordered by the physician       If an additional preferred facility not listed above is identified, additional referral to be sent. If above facilities unable to accept, will send additional referrals to in-network providers.     2:37 PM  DORI received V.M. from Lynn at [facility] regarding pt's referral and SW contacted Lynn back. SW provided attendings phone number and fax number for signature of pt's plan of care document.       Esther Cevallos, URIEL, LMSW  [phone]  [facility]  Case  Management

## 2025-08-04 NOTE — DISCHARGE SUMMARY
"[facility] [address] [place] [address] Medicine  Discharge Summary      Patient Name: Marbin Cortes  MRN: [id number]  ROBIN: [id number]  Patient Class: OP- Observation  Admission Date: 8/3/2025  Hospital Length of Stay: 0 days  Discharge Date and Time: 08/04/2025 12:34 PM  Attending Physician: Syeda Edmond MD   Discharging Provider: Syeda Edmond MD  Primary Care Provider: Denise, Primary Doctor  [facility] Medicine Team: [facility] Syeda Edmond MD  Primary Care Team: [facility]    HPI:   Marbin Cortes is a 69 y.o. with pmh of cervical spondylosis and chronic lower back pain s/p lumbar spine surgery ~30 years ago who presents to the ED for fall. Patient reports falling to the ground while getting out of his car yesterday. He states it felt as though his legs gave out on him. He denies any LOC or head strike. He was able to recall events both before and after the fall with full clarity. He reports feeling like he never fully got his strength back. At present he is able to ambulate with a cane which he uses intermittently although he feels below his baseline. His wife was concerned about dehydration as the neighbor reported he "didn't look himself." He has significantly increased his water intake since the fall. At time of examination, he reports continued weakness but overall feels "well." He does not some dysuria for the past couple days. He denies any fevers, chills, SOB, chest pain, nausea. Vomiting, constipation. Diarrhea.    In the ED: afebrile, VSS. Labs largely unremarkable. UA with 3+ leukocytes, >100 WBCs, many bacteria Xr chest without acute process. MRI lumbar spine without acute spinal complication, however was noted tohave L hydronephrosis. CT abdomen/pelvis w/ Isolated hydronephrosis of the left kidney terminating at what appears to be UPJ obstruction.      * No surgery found *      Hospital Course:   Admitted for fall 2/2 lower extremity weakness. MRI done given prior back surgery which " showed left hydronephrosis, confirmed on CT imaging. Concerning for UPJ obstruction and UA concerning for UTI. Urology consulted. Recommend Ucx and oral abx with outpt urology f/u and lasix renal scan.   Ambulated peter with assistive device.  PT/OT ordered.   Stable for discharge with urology and PCP followup.   Patient seen and evaluated on day of discharge and deemed appropriate for discharge. Plan discussed with pt, who was agreeable and amenable; medications were discussed and reviewed, outpatient follow-up scheduled, ER precautions were given, all questions were answered to the pt's satisfaction, and patient was subsequently discharged.         Goals of Care Treatment Preferences:  Code Status: DNR         Consults:   Consults (From admission, onward)          Status Ordering Provider     Inpatient consult to Urology  Once        Provider:  (Not yet assigned)    MARILU Han            Assessment & Plan  Hydronephrosis  Ureteropelvic junction (UPJ) obstruction, left  - hydronephrosis incidentally noted on MRI  - CT abd/pel: Isolated hydronephrosis of the left kidney terminating at what appears to be UPJ obstruction   - Urology consulted, appreciate assistance  - given no renal insufficiency, urology recommend outpt f/u with lasix renal scan   Weakness  Fall  - patient with mechanical fall yesterday due to lower extremity weakness  - ambulates with cane intermittently, currently feels below his baseline  - MRI spine without acute spinal process  - ambulated peter with cane,  PT/OT ordered  UTI (urinary tract infection)  - patient reporting dysuria for the last couple days  - UA with 3+ leukocytes, >100 WBCs, Many bacteria  - Started on rocephin for now, transition to cipro 10 days, will follow culture- low concern for MDR     Final Active Diagnoses:    Diagnosis Date Noted POA    PRINCIPAL PROBLEM:  Hydronephrosis [N13.30] 08/03/2025 Yes    Ureteropelvic junction (UPJ) obstruction, left [N13.5]  08/04/2025 Yes    Weakness [R53.1] 08/03/2025 Yes    Fall [W19.XXXA] 08/03/2025 Yes    UTI (urinary tract infection) [N39.0] 08/03/2025 Yes      Problems Resolved During this Admission:       Discharged Condition: stable    Disposition: Home or Self Care    Follow Up:   Contact information for follow-up providers       No, Primary Doctor. Schedule an appointment as soon as possible for a visit in 1 week(s).                       Contact information for after-discharge care       [facility]       [facility] .    Services: Home Nursing, Home Rehabilitation  Contact information:  [address] [facility] [address] [phone]                                 Patient Instructions:      NM Renogram With Lasix   Standing Status: Future Standing Exp. Date: 08/04/26     Order Specific Question Answer Comments   May the Radiologist modify the order per protocol to meet the clinical needs of the patient? Yes      Ambulatory referral/consult to Urology   Standing Status: Future   Referral Priority: Urgent Referral Type: Consultation   Referral Reason: Specialty Services Required   Requested Specialty: Urology   Number of Visits Requested: 1     Diet Adult Regular     Notify your health care provider if you experience any of the following:  temperature >100.4     Notify your health care provider if you experience any of the following:  severe uncontrolled pain     Notify your health care provider if you experience any of the following:  increased confusion or weakness     Notify your health care provider if you experience any of the following:  persistent dizziness, light-headedness, or visual disturbances     Activity as tolerated       Significant Diagnostic Studies: Labs: All labs within the past 24 hours have been reviewed    Pending Diagnostic Studies:       Procedure Component Value Units Date/Time    GREY TOP URINE HOLD [[id number]] Collected: 08/03/25 4715    Order Status: Sent Lab  Status: In process Updated: 08/03/25 1734    Specimen: Urine     HCV Virus Hold Specimen [[id number]] Collected: 08/03/25 1524    Order Status: Sent Lab Status: In process Updated: 08/03/25 1532    Specimen: Blood            Medications:  Reconciled Home Medications:      Medication List        START taking these medications      ciprofloxacin HCl 500 MG tablet  Commonly known as: CIPRO  Take 1 tablet (500 mg total) by mouth 2 (two) times daily. for 10 days     Lactobacillus rhamnosus GG 10 billion cell capsule  Commonly known as: CULTURELLE  Take 1 capsule by mouth once daily. for 10 days            CONTINUE taking these medications      acetaminophen 500 MG tablet  Commonly known as: TYLENOL  Take 500 mg by mouth every 6 (six) hours as needed for Pain.     ibuprofen 200 MG tablet  Commonly known as: ADVIL,MOTRIN  Take 200 mg by mouth every 6 (six) hours as needed for Pain.     methocarbamoL 500 MG Tab  Commonly known as: Robaxin  Take 1 tablet (500 mg total) by mouth 3 (three) times daily as needed (neck pain).              Indwelling Lines/Drains at time of discharge:   Lines/Drains/Airways       None                       Time spent on the discharge of patient: 40 minutes         Syeda Edmond MD  Department of Hospital Medicine  [facility]

## 2025-08-04 NOTE — ASSESSMENT & PLAN NOTE
- patient with mechanical fall yesterday due to lower extremity weakness  - ambulates with cane intermittently, currently feels below his baseline  - MRI spine without acute spinal process  - Pt/Ot consulted

## 2025-08-04 NOTE — CONSULTS
[facility]  Urology  Consult Note    Patient Name: Marbin Cortes  MRN: [id number]  Admission Date: 8/3/2025  Hospital Length of Stay: 0   Code Status: DNR   Attending Provider: Syeda Edmond MD   Consulting Provider: Mely Diez MD  Primary Care Physician: Denise, Primary Doctor  Principal Problem:Hydronephrosis    Inpatient consult to Urology  Consult performed by: Mely Diez MD  Consult ordered by: Alfredito Sanchez, PA-C  Reason for consult: UPJ obstruction 2/2 crossing vessel          Subjective:     HPI:   Marbin Cortes is a 69 y.o. with pmh of cervical spondylosis and chronic lower back pain s/p lumbar spine surgery ~30 years ago who presents to the ED for fall. Patient reports falling to the ground while getting out of his car yesterday. He states it felt as though his legs gave out on him. Urology is consulted to work up left sided hydronephrosis and a UPJ obstruction with renal parenchymal thinning, with a crossing vessel found incidentally on CT A/P.    Patient is a febrile, nontachycardic, and normotensive. WBC are 5, Cr 1.1 at his baseline. UA is positive for leuks and blood, negative for nitrites. Microscopy is positive for 9 RBC, >100 WBC, many bacteria.    Upon interview, patient is stable. He has minimal urological problems except dribbling which he has seen Dr. Winston.    Past Medical History:   Diagnosis Date    GERD (gastroesophageal reflux disease)        Past Surgical History:   Procedure Laterality Date    HERNIA REPAIR      SPINE SURGERY      UPPER GASTROINTESTINAL ENDOSCOPY         Review of patient's allergies indicates:  No Known Allergies    Family History       Problem Relation (Age of Onset)    Cancer Paternal Grandmother    Diabetes Mother    Liver disease Paternal Grandmother    Stroke Mother, Father            Tobacco Use    Smoking status: Never    Smokeless tobacco: Never   Vaping Use    Vaping status: Never Used   Substance and Sexual Activity    Alcohol  use: No    Drug use: No    Sexual activity: Not on file       Review of Systems    Objective:     Temp:  [96.7 °F (35.9 °C)-98.4 °F (36.9 °C)] 97.4 °F (36.3 °C)  Pulse:  [] 76  Resp:  [16-18] 16  SpO2:  [95 %-97 %] 96 %  BP: (122-139)/(69-82) 122/69  Weight: 89.4 kg (197 lb 1.5 oz)  Body mass index is 26 kg/m².           Drains       None                    Physical Exam  Constitutional:       Appearance: Normal appearance.   HENT:      Head: Normocephalic.   Eyes:      Pupils: Pupils are equal, round, and reactive to light.   Cardiovascular:      Rate and Rhythm: Normal rate.      Pulses: Normal pulses.   Abdominal:      General: Abdomen is flat.      Palpations: Abdomen is soft.      Tenderness: There is no right CVA tenderness or left CVA tenderness.   Musculoskeletal:      Cervical back: Normal range of motion.   Skin:     General: Skin is warm.      Capillary Refill: Capillary refill takes less than 2 seconds.   Neurological:      Mental Status: He is alert and oriented to person, place, and time.          Significant Labs:    BMP:  Recent Labs   Lab 08/03/25  1524 08/04/25  0407   * 133*   K 4.8 3.5    101   CO2 21* 22*   BUN 17 16   CREATININE 1.2 1.1   CALCIUM 9.0 8.2*       CBC:  Recent Labs   Lab 08/03/25  1524 08/04/25  0407   WBC 6.92 5.31   HGB 16.1 13.8*   HCT 49.0 41.3    165       All pertinent labs results from the past 24 hours have been reviewed.    Significant Imaging:  All pertinent imaging results/findings from the past 24 hours have been reviewed.                    Assessment and Plan:     Ureteropelvic junction (UPJ) obstruction, left  Marbin Cortes is a 69 y.o. M with incidentally found UPJ obstruction from a crossing vessel, left hydronephrosis, and renal parenchymal thinning     --Patient admitted to hospital medicine for syncopal episode  --Stable cr, no white count, hemodynamically stable  --Send urine for culture, treat appropriately with 10 days of  abx  --Given renal function is at baseline, he can follow up with an outpatient lasix renal scan   --Follow up with urology outpatient for further discussion regarding UPJ obstruction and surgical options    We are currently treating this as a bladder infection, if there is a change in his vitals, he develops a fever, or flank pain please consult us urgently for interventional management.        VTE Risk Mitigation (From admission, onward)           Ordered     IP VTE LOW RISK PATIENT  Once         08/03/25 2203     Place sequential compression device  Until discontinued         08/03/25 2203                    Thank you for your consult. I will sign off. Please contact us if you have any additional questions.    Mely Diez MD  Urology  [facility]

## 2025-08-04 NOTE — SUBJECTIVE & OBJECTIVE
Past Medical History:   Diagnosis Date    GERD (gastroesophageal reflux disease)        Past Surgical History:   Procedure Laterality Date    HERNIA REPAIR      SPINE SURGERY      UPPER GASTROINTESTINAL ENDOSCOPY         Review of patient's allergies indicates:  No Known Allergies    Family History       Problem Relation (Age of Onset)    Cancer Paternal Grandmother    Diabetes Mother    Liver disease Paternal Grandmother    Stroke Mother, Father            Tobacco Use    Smoking status: Never    Smokeless tobacco: Never   Vaping Use    Vaping status: Never Used   Substance and Sexual Activity    Alcohol use: No    Drug use: No    Sexual activity: Not on file       Review of Systems    Objective:     Temp:  [96.7 °F (35.9 °C)-98.4 °F (36.9 °C)] 97.4 °F (36.3 °C)  Pulse:  [] 76  Resp:  [16-18] 16  SpO2:  [95 %-97 %] 96 %  BP: (122-139)/(69-82) 122/69  Weight: 89.4 kg (197 lb 1.5 oz)  Body mass index is 26 kg/m².           Drains       None                    Physical Exam  Constitutional:       Appearance: Normal appearance.   HENT:      Head: Normocephalic.   Eyes:      Pupils: Pupils are equal, round, and reactive to light.   Cardiovascular:      Rate and Rhythm: Normal rate.      Pulses: Normal pulses.   Abdominal:      General: Abdomen is flat.      Palpations: Abdomen is soft.      Tenderness: There is no right CVA tenderness or left CVA tenderness.   Musculoskeletal:      Cervical back: Normal range of motion.   Skin:     General: Skin is warm.      Capillary Refill: Capillary refill takes less than 2 seconds.   Neurological:      Mental Status: He is alert and oriented to person, place, and time.          Significant Labs:    BMP:  Recent Labs   Lab 08/03/25  1524 08/04/25  0407   * 133*   K 4.8 3.5    101   CO2 21* 22*   BUN 17 16   CREATININE 1.2 1.1   CALCIUM 9.0 8.2*       CBC:  Recent Labs   Lab 08/03/25  1524 08/04/25  0407   WBC 6.92 5.31   HGB 16.1 13.8*   HCT 49.0 41.3    165        All pertinent labs results from the past 24 hours have been reviewed.    Significant Imaging:  All pertinent imaging results/findings from the past 24 hours have been reviewed.

## 2025-08-04 NOTE — NURSING TRANSFER
Nursing Transfer Note      Pt arrived from the er via stretcher accompanied by transporter.aaox4.resp even and non labored.sl intact to rh.reports weakness to ble.right > left.skin intact.vss.safety precautions implemented.bed in low position.rails up x3.call bell in reach.bed alarm activated for pt safety.

## 2025-08-04 NOTE — ASSESSMENT & PLAN NOTE
- patient reporting dysuria for the last couple days  - UA with 3+ leukocytes, >100 WBCs, Many bacteria  - Started on rocephin for now  - follow cultures

## 2025-08-04 NOTE — PLAN OF CARE
[facility]  Discharge Final Note    Primary Care Provider: No, Primary Doctor  Future Appointments   Date Time Provider Department Center   8/11/2025  1:15 PM Carley Gibson DO [facility] IM [facility]   8/13/2025  1:40 PM Shannan Zuniga NP [facility] UROLOGY [facility]       Expected Discharge Date: 8/4/2025    Final Discharge Note (most recent)       Final Note - 08/04/25 1514          Final Note    Assessment Type Final Discharge Note (P)      Anticipated Discharge Disposition Home-Health Care Svc (P)      What phone number can be called within the next 1-3 days to see how you are doing after discharge? [phone] (P)      Hospital Resources/Appts/Education Provided Appointments scheduled and added to AVS;Post-Acute resouces added to AVS (P)         Post-Acute Status    Post-Acute Authorization Home Health (P)      Home Health Status Set-up Complete/Auth obtained (P)      Discharge Delays None known at this time (P)                      Important Message from Medicare      Pt in observation status. Pt did not receive IMM.         Follow-up providers       No, Primary Doctor   Relationship: PCP - General        Next Steps: Schedule an appointment as soon as possible for a visit in 1 week(s)              After-discharge care                Home Medical Care       [facility]   Service: Home Nursing, Home Rehabilitation    [facility] [place] [id number]   Phone: [phone]                             Discharge Plan A and Plan B have been determined by review of patient's clinical status, future medical and therapeutic needs, and coverage/benefits for post-acute care in coordination with multidisciplinary team members.    Pt d/c'd home with [facility]. Pt's spouse provided transportation home at the time of d/c. Pt had no other post-acute needs at this time.     URIEL Mulligan, LMSW  [phone]  [facility]  Case Management

## 2025-08-04 NOTE — PLAN OF CARE
Problem: Hospitalized Older Adult  Goal: Optimal Cognitive Function  Outcome: Met  Goal: Effective Bowel Elimination  Outcome: Met  Goal: Optimal Coping  Outcome: Met  Goal: Fluid and Electrolyte Balance  Outcome: Met  Goal: Optimal Functional Ability  Outcome: Met  Goal: Improved Oral Intake  Outcome: Met  Goal: Adequate Sleep/Rest  Outcome: Met  Goal: Effective Urinary Elimination  Outcome: Met     Problem: Adult Inpatient Plan of Care  Goal: Plan of Care Review  Outcome: Met  Goal: Patient-Specific Goal (Individualized)  Outcome: Met  Goal: Absence of Hospital-Acquired Illness or Injury  Outcome: Met  Goal: Optimal Comfort and Wellbeing  Outcome: Met  Goal: Readiness for Transition of Care  Outcome: Met     Problem: UTI (Urinary Tract Infection)  Goal: Improved Infection Symptoms  Outcome: Met     Problem: Fall Injury Risk  Goal: Absence of Fall and Fall-Related Injury  Outcome: Met     Problem: Mobility Impairment  Goal: Optimal Mobility  Outcome: Met     Problem: Skin Injury Risk Increased  Goal: Skin Health and Integrity  Outcome: Met     Discharge orders in

## 2025-08-04 NOTE — ASSESSMENT & PLAN NOTE
Marbin Cortes is a 69 y.o. M with incidentally found UPJ obstruction and left hydronephrosis     --Patient admitted to hospital medicine for syncopal episode  --send urine for culture, treat appropriately with 10 days of abx  --Outpatient lasix renal scan   --Follow up with urology outpatient for further discussion regarding UPJ obstruction and surgical options

## 2025-08-04 NOTE — PLAN OF CARE
[facility]      HOME HEALTH ORDERS  FACE TO FACE ENCOUNTER    Patient Name: Marbin Cortes  YOB: 1955    PCP: No, Primary Doctor   PCP Address: None  PCP Phone Number: None  PCP Fax: None    Encounter Date: 8/3/25    Admit to Home Health    Diagnoses:  Active Hospital Problems    Diagnosis  POA    *Hydronephrosis [N13.30]  Yes    Ureteropelvic junction (UPJ) obstruction, left [N13.5]  Yes    Weakness [R53.1]  Yes    Fall [W19.XXXA]  Yes    UTI (urinary tract infection) [N39.0]  Yes      Resolved Hospital Problems   No resolved problems to display.       Follow Up Appointments:  Future Appointments   Date Time Provider Department Center   8/11/2025  1:15 PM Carley Gibson DO [facility] IM [facility]       Allergies:Review of patient's allergies indicates:  No Known Allergies    Medications: Review discharge medications with patient and family and provide education.    Current Medications[1]     Medication List        START taking these medications      ciprofloxacin HCl 500 MG tablet  Commonly known as: CIPRO  Take 1 tablet (500 mg total) by mouth 2 (two) times daily. for 10 days     Lactobacillus rhamnosus GG 10 billion cell capsule  Commonly known as: CULTURELLE  Take 1 capsule by mouth once daily. for 10 days            CONTINUE taking these medications      acetaminophen 500 MG tablet  Commonly known as: TYLENOL  Take 500 mg by mouth every 6 (six) hours as needed for Pain.     ibuprofen 200 MG tablet  Commonly known as: ADVIL,MOTRIN  Take 200 mg by mouth every 6 (six) hours as needed for Pain.     methocarbamoL 500 MG Tab  Commonly known as: Robaxin  Take 1 tablet (500 mg total) by mouth 3 (three) times daily as needed (neck pain).                I have seen and examined this patient within the last 30 days. My clinical findings that support the need for the home health skilled services and home bound status are the following:no   Weakness/numbness causing balance and gait  disturbance due to Weakness/Debility making it taxing to leave home.     Diet:   regular diet    Labs:  none    Referrals/ Consults  Physical Therapy to evaluate and treat. Evaluate for home safety and equipment needs; Establish/upgrade home exercise program. Perform / instruct on therapeutic exercises, gait training, transfer training, and Range of Motion.  Occupational Therapy to evaluate and treat. Evaluate home environment for safety and equipment needs. Perform/Instruct on transfers, ADL training, ROM, and therapeutic exercises.    Activities:   activity as tolerated    Nursing:   Agency to admit patient within 24 hours of hospital discharge unless specified on physician order or at patient request    SN to complete comprehensive assessment including routine vital signs. Instruct on disease process and s/s of complications to report to MD. Review/verify medication list sent home with the patient at time of discharge  and instruct patient/caregiver as needed. Frequency may be adjusted depending on start of care date.     Skilled nurse to perform up to 3 visits PRN for symptoms related to diagnosis    Notify MD if SBP > 160 or < 90; DBP > 90 or < 50; HR > 120 or < 50; Temp > 101; O2 < 88%; Other:       Ok to schedule additional visits based on staff availability and patient request on consecutive days within the home health episode.    When multiple disciplines ordered:    Start of Care occurs on Sunday - Wednesday schedule remaining discipline evaluations as ordered on separate consecutive days following the start of care.    Thursday SOC -schedule subsequent evaluations Friday and Monday the following week.     Friday - Saturday SOC - schedule subsequent discipline evaluations on consecutive days starting Monday of the following week.    For all post-discharge communication and subsequent orders please contact patient's primary care physician. If unable to reach primary care physician or do not receive response  within 30 minutes, please contact PCP for clinical staff order clarification    Miscellaneous   Routine Skin for Bedridden Patients: Instruct patient/caregiver to apply moisture barrier cream to all skin folds and wet areas in perineal area daily and after baths and all bowel movements.    Home Health Aide:  Physical Therapy Three times weekly and Occupational Therapy Three times weekly    Wound Care Orders  no    I certify that this patient is confined to his home and needs physical therapy and occupational therapy.               [1]   Current Facility-Administered Medications   Medication Dose Route Frequency Provider Last Rate Last Admin    acetaminophen tablet 650 mg  650 mg Oral Q4H PRN Johnathon, Rigoberto P, PA-C        cefTRIAXone injection 1 g  1 g Intravenous Q24H Johnathon, Rigoberto P, PA-C        dextrose 50% injection 12.5 g  12.5 g Intravenous PRN Johnathon, Rigoberto P, PA-C        dextrose 50% injection 25 g  25 g Intravenous PRN Johnathon, Rigoberto P, PA-C        glucagon (human recombinant) injection 1 mg  1 mg Intramuscular PRN Johnathon, Rigoberto P, PA-C        glucose chewable tablet 16 g  16 g Oral PRN Johnathon, Rigoberto P, PA-C        glucose chewable tablet 24 g  24 g Oral PRN Johnathon, Rigoberto P, PA-C        melatonin tablet 6 mg  6 mg Oral Nightly PRN Johnathon, Rigoberto P, PA-C        naloxone 0.4 mg/mL injection 0.02 mg  0.02 mg Intravenous PRN Johnathon, Rigoberto P, PA-C        ondansetron disintegrating tablet 8 mg  8 mg Oral Q8H PRN Johnathon, Rigoberto P, PA-C        sodium chloride 0.9% flush 10 mL  10 mL Intravenous Q12H PRN Johnathon, Rigoberto P, PA-C

## 2025-08-04 NOTE — ASSESSMENT & PLAN NOTE
- patient reporting dysuria for the last couple days  - UA with 3+ leukocytes, >100 WBCs, Many bacteria  - Started on rocephin for now, transition to cipro 10 days, will follow culture- low concern for MDR

## 2025-08-04 NOTE — SUBJECTIVE & OBJECTIVE
Past Medical History:   Diagnosis Date    GERD (gastroesophageal reflux disease)        Past Surgical History:   Procedure Laterality Date    HERNIA REPAIR      SPINE SURGERY      UPPER GASTROINTESTINAL ENDOSCOPY         Review of patient's allergies indicates:  No Known Allergies    No current facility-administered medications on file prior to encounter.     Current Outpatient Medications on File Prior to Encounter   Medication Sig    acetaminophen (TYLENOL) 500 MG tablet Take 500 mg by mouth every 6 (six) hours as needed for Pain.    ibuprofen (ADVIL,MOTRIN) 200 MG tablet Take 200 mg by mouth every 6 (six) hours as needed for Pain.    methocarbamoL (ROBAXIN) 500 MG Tab Take 1 tablet (500 mg total) by mouth 3 (three) times daily as needed (neck pain).     Family History       Problem Relation (Age of Onset)    Cancer Paternal Grandmother    Diabetes Mother    Liver disease Paternal Grandmother    Stroke Mother, Father          Tobacco Use    Smoking status: Never    Smokeless tobacco: Never   Substance and Sexual Activity    Alcohol use: No    Drug use: No    Sexual activity: Not on file     Review of Systems   Constitutional:  Negative for activity change, chills and fever.   HENT:  Negative for trouble swallowing.    Eyes:  Negative for photophobia and visual disturbance.   Respiratory:  Negative for cough, chest tightness and shortness of breath.    Cardiovascular:  Negative for chest pain, palpitations and leg swelling.   Gastrointestinal:  Negative for abdominal pain, constipation, diarrhea, nausea and vomiting.   Genitourinary:  Positive for dysuria. Negative for frequency and hematuria.   Musculoskeletal:  Negative for back pain and gait problem.   Skin:  Negative for rash and wound.   Neurological:  Positive for weakness (Lower extremities). Negative for dizziness, syncope, speech difficulty and light-headedness.   Psychiatric/Behavioral:  Negative for agitation and confusion. The patient is not  nervous/anxious.      Objective:     Vital Signs (Most Recent):  Temp: 98.4 °F (36.9 °C) (08/03/25 2107)  Pulse: 80 (08/03/25 2107)  Resp: 18 (08/03/25 1341)  BP: 125/82 (08/03/25 2107)  SpO2: 95 % (08/03/25 2107) Vital Signs (24h Range):  Temp:  [98.1 °F (36.7 °C)-98.4 °F (36.9 °C)] 98.4 °F (36.9 °C)  Pulse:  [] 80  Resp:  [18] 18  SpO2:  [95 %-96 %] 95 %  BP: (123-125)/(77-82) 125/82     Weight: 86.2 kg (190 lb)  Body mass index is 25.07 kg/m².     Physical Exam  Vitals and nursing note reviewed.   Constitutional:       General: He is not in acute distress.     Appearance: Normal appearance. He is not ill-appearing.   HENT:      Head: Normocephalic and atraumatic.      Right Ear: External ear normal.      Left Ear: External ear normal.   Eyes:      Extraocular Movements: Extraocular movements intact.      Conjunctiva/sclera: Conjunctivae normal.      Pupils: Pupils are equal, round, and reactive to light.   Cardiovascular:      Rate and Rhythm: Normal rate and regular rhythm.      Pulses: Normal pulses.      Heart sounds: Normal heart sounds. No murmur heard.  Pulmonary:      Effort: Pulmonary effort is normal. No respiratory distress.      Breath sounds: Normal breath sounds.   Abdominal:      General: Abdomen is flat. Bowel sounds are normal.      Palpations: Abdomen is soft.      Tenderness: There is no abdominal tenderness.   Musculoskeletal:         General: Normal range of motion.      Cervical back: Normal range of motion and neck supple. No tenderness.      Right lower leg: No edema.      Left lower leg: No edema.   Skin:     General: Skin is warm and dry.      Capillary Refill: Capillary refill takes less than 2 seconds.      Coloration: Skin is not jaundiced.   Neurological:      General: No focal deficit present.      Mental Status: He is alert and oriented to person, place, and time. Mental status is at baseline.   Psychiatric:         Mood and Affect: Mood normal.         Behavior: Behavior  normal.              CRANIAL NERVES     CN III, IV, VI   Pupils are equal, round, and reactive to light.       Significant Labs: All pertinent labs within the past 24 hours have been reviewed.  CBC:   Recent Labs   Lab 08/03/25  1524   WBC 6.92   HGB 16.1   HCT 49.0        CMP:   Recent Labs   Lab 08/03/25  1524   *   K 4.8      CO2 21*   *   BUN 17   CREATININE 1.2   CALCIUM 9.0   PROT 8.0   ALBUMIN 3.7   BILITOT 1.1*   ALKPHOS 73   AST 68*   ALT 47   ANIONGAP 13       Significant Imaging: I have reviewed all pertinent imaging results/findings within the past 24 hours.

## 2025-08-04 NOTE — HOSPITAL COURSE
Admitted for fall 2/2 lower extremity weakness. MRI done given prior back surgery which showed left hydronephrosis, confirmed on CT imaging. Concerning for UPJ obstruction and UA concerning for UTI. Urology consulted. Recommend Ucx and oral abx with outpt urology f/u and lasix renal scan.   Ambulated peter with assistive device. HH PT/OT ordered.   Stable for discharge with urology and PCP followup.   Patient seen and evaluated on day of discharge and deemed appropriate for discharge. Plan discussed with pt, who was agreeable and amenable; medications were discussed and reviewed, outpatient follow-up scheduled, ER precautions were given, all questions were answered to the pt's satisfaction, and patient was subsequently discharged.

## 2025-08-04 NOTE — ASSESSMENT & PLAN NOTE
- patient with mechanical fall yesterday due to lower extremity weakness  - ambulates with cane intermittently, currently feels below his baseline  - MRI spine without acute spinal process  - ambulated peter with cane, HH PT/OT ordered

## 2025-08-04 NOTE — PLAN OF CARE
Problem: Hospitalized Older Adult  Goal: Optimal Cognitive Function  Outcome: Progressing  Goal: Effective Bowel Elimination  Outcome: Progressing  Goal: Optimal Coping  Outcome: Progressing  Goal: Fluid and Electrolyte Balance  Outcome: Progressing  Goal: Optimal Functional Ability  Outcome: Progressing  Goal: Improved Oral Intake  Outcome: Progressing  Goal: Adequate Sleep/Rest  Outcome: Progressing  Goal: Effective Urinary Elimination  Outcome: Progressing     Problem: Adult Inpatient Plan of Care  Goal: Plan of Care Review  Outcome: Progressing  Goal: Patient-Specific Goal (Individualized)  Outcome: Progressing  Goal: Absence of Hospital-Acquired Illness or Injury  Outcome: Progressing  Goal: Optimal Comfort and Wellbeing  Outcome: Progressing  Goal: Readiness for Transition of Care  Outcome: Progressing     Problem: UTI (Urinary Tract Infection)  Goal: Improved Infection Symptoms  Outcome: Progressing     Problem: Fall Injury Risk  Goal: Absence of Fall and Fall-Related Injury  Outcome: Progressing     Problem: Mobility Impairment  Goal: Optimal Mobility  Outcome: Progressing     Problem: Skin Injury Risk Increased  Goal: Skin Health and Integrity  Outcome: Progressing

## 2025-08-04 NOTE — H&P
"[facility] - Emergency Dept  [facility] Medicine  History & Physical    Patient Name: Marbin Cortes  MRN: [id number]  Patient Class: OP- Observation  Admission Date: 8/3/2025  Attending Physician: Syeda Edmond MD   Primary Care Provider: Denise, Primary Doctor         Patient information was obtained from patient and ER records.     Subjective:     Principal Problem:Hydronephrosis    Chief Complaint:   Chief Complaint   Patient presents with    Loss of Consciousness     1 episode yesterday sent from  for cardiac workup.  Denies symptoms          HPI: Marbin Cortes is a 69 y.o. with pmh of cervical spondylosis and chronic lower back pain s/p lumbar spine surgery ~30 years ago who presents to the ED for fall. Patient reports falling to the ground while getting out of his car yesterday. He states it felt as though his legs gave out on him. He denies any LOC or head strike. He was able to recall events both before and after the fall with full clarity. He reports feeling like he never fully got his strength back. At present he is able to ambulate with a cane which he uses intermittently although he feels below his baseline. His wife was concerned about dehydration as the neighbor reported he "didn't look himself." He has significantly increased his water intake since the fall. At time of examination, he reports continued weakness but overall feels "well." He does not some dysuria for the past couple days. He denies any fevers, chills, SOB, chest pain, nausea. Vomiting, constipation. Diarrhea.    In the ED: afebrile, VSS. Labs largely unremarkable. UA with 3+ leukocytes, >100 WBCs, many bacteria Xr chest without acute process. MRI lumbar spine without acute spinal complication, however was noted tohave L hydronephrosis. CT abdomen/pelvis w/ Isolated hydronephrosis of the left kidney terminating at what appears to be UPJ obstruction.      Past Medical History:   Diagnosis Date    GERD (gastroesophageal reflux " disease)        Past Surgical History:   Procedure Laterality Date    HERNIA REPAIR      SPINE SURGERY      UPPER GASTROINTESTINAL ENDOSCOPY         Review of patient's allergies indicates:  No Known Allergies    No current facility-administered medications on file prior to encounter.     Current Outpatient Medications on File Prior to Encounter   Medication Sig    acetaminophen (TYLENOL) 500 MG tablet Take 500 mg by mouth every 6 (six) hours as needed for Pain.    ibuprofen (ADVIL,MOTRIN) 200 MG tablet Take 200 mg by mouth every 6 (six) hours as needed for Pain.    methocarbamoL (ROBAXIN) 500 MG Tab Take 1 tablet (500 mg total) by mouth 3 (three) times daily as needed (neck pain).     Family History       Problem Relation (Age of Onset)    Cancer Paternal Grandmother    Diabetes Mother    Liver disease Paternal Grandmother    Stroke Mother, Father          Tobacco Use    Smoking status: Never    Smokeless tobacco: Never   Substance and Sexual Activity    Alcohol use: No    Drug use: No    Sexual activity: Not on file     Review of Systems   Constitutional:  Negative for activity change, chills and fever.   HENT:  Negative for trouble swallowing.    Eyes:  Negative for photophobia and visual disturbance.   Respiratory:  Negative for cough, chest tightness and shortness of breath.    Cardiovascular:  Negative for chest pain, palpitations and leg swelling.   Gastrointestinal:  Negative for abdominal pain, constipation, diarrhea, nausea and vomiting.   Genitourinary:  Positive for dysuria. Negative for frequency and hematuria.   Musculoskeletal:  Negative for back pain and gait problem.   Skin:  Negative for rash and wound.   Neurological:  Positive for weakness (Lower extremities). Negative for dizziness, syncope, speech difficulty and light-headedness.   Psychiatric/Behavioral:  Negative for agitation and confusion. The patient is not nervous/anxious.      Objective:     Vital Signs (Most Recent):  Temp: 98.4 °F  (36.9 °C) (08/03/25 2107)  Pulse: 80 (08/03/25 2107)  Resp: 18 (08/03/25 1341)  BP: 125/82 (08/03/25 2107)  SpO2: 95 % (08/03/25 2107) Vital Signs (24h Range):  Temp:  [98.1 °F (36.7 °C)-98.4 °F (36.9 °C)] 98.4 °F (36.9 °C)  Pulse:  [] 80  Resp:  [18] 18  SpO2:  [95 %-96 %] 95 %  BP: (123-125)/(77-82) 125/82     Weight: 86.2 kg (190 lb)  Body mass index is 25.07 kg/m².     Physical Exam  Vitals and nursing note reviewed.   Constitutional:       General: He is not in acute distress.     Appearance: Normal appearance. He is not ill-appearing.   HENT:      Head: Normocephalic and atraumatic.      Right Ear: External ear normal.      Left Ear: External ear normal.   Eyes:      Extraocular Movements: Extraocular movements intact.      Conjunctiva/sclera: Conjunctivae normal.      Pupils: Pupils are equal, round, and reactive to light.   Cardiovascular:      Rate and Rhythm: Normal rate and regular rhythm.      Pulses: Normal pulses.      Heart sounds: Normal heart sounds. No murmur heard.  Pulmonary:      Effort: Pulmonary effort is normal. No respiratory distress.      Breath sounds: Normal breath sounds.   Abdominal:      General: Abdomen is flat. Bowel sounds are normal.      Palpations: Abdomen is soft.      Tenderness: There is no abdominal tenderness.   Musculoskeletal:         General: Normal range of motion.      Cervical back: Normal range of motion and neck supple. No tenderness.      Right lower leg: No edema.      Left lower leg: No edema.   Skin:     General: Skin is warm and dry.      Capillary Refill: Capillary refill takes less than 2 seconds.      Coloration: Skin is not jaundiced.   Neurological:      General: No focal deficit present.      Mental Status: He is alert and oriented to person, place, and time. Mental status is at baseline.   Psychiatric:         Mood and Affect: Mood normal.         Behavior: Behavior normal.              CRANIAL NERVES     CN III, IV, VI   Pupils are equal, round,  and reactive to light.       Significant Labs: All pertinent labs within the past 24 hours have been reviewed.  CBC:   Recent Labs   Lab 08/03/25  1524   WBC 6.92   HGB 16.1   HCT 49.0        CMP:   Recent Labs   Lab 08/03/25  1524   *   K 4.8      CO2 21*   *   BUN 17   CREATININE 1.2   CALCIUM 9.0   PROT 8.0   ALBUMIN 3.7   BILITOT 1.1*   ALKPHOS 73   AST 68*   ALT 47   ANIONGAP 13       Significant Imaging: I have reviewed all pertinent imaging results/findings within the past 24 hours.  Assessment/Plan:     Assessment & Plan  Hydronephrosis  - hydronephrosis incidentally noted on MRI  - CT abd/pel: Isolated hydronephrosis of the left kidney terminating at what appears to be UPJ obstruction   - Urology consulted, appreciate assistance  - rec admission overnight with plan for full eval tomorrow morning  Weakness  Fall  - patient with mechanical fall yesterday due to lower extremity weakness  - ambulates with cane intermittently, currently feels below his baseline  - MRI spine without acute spinal process  - Pt/Ot consulted  UTI (urinary tract infection)  - patient reporting dysuria for the last couple days  - UA with 3+ leukocytes, >100 WBCs, Many bacteria  - Started on rocephin for now  - follow cultures  VTE Risk Mitigation (From admission, onward)           Ordered     IP VTE LOW RISK PATIENT  Once         08/03/25 2203     Place sequential compression device  Until discontinued         08/03/25 2203                                     On 08/03/2025, patient should be placed in hospital observation services under my care in collaboration with Angus Cosme MD.           Rigoberto Diego PA-C  Department of Hospital Medicine  [facility] - Emergency Dept

## 2025-08-04 NOTE — ASSESSMENT & PLAN NOTE
- hydronephrosis incidentally noted on MRI  - CT abd/pel: Isolated hydronephrosis of the left kidney terminating at what appears to be UPJ obstruction   - Urology consulted, appreciate assistance  - rec admission overnight with plan for full eval tomorrow morning

## 2025-08-04 NOTE — HPI
"Marbin Cortes is a 69 y.o. with pmh of cervical spondylosis and chronic lower back pain s/p lumbar spine surgery ~30 years ago who presents to the ED for fall. Patient reports falling to the ground while getting out of his car yesterday. He states it felt as though his legs gave out on him. He denies any LOC or head strike. He was able to recall events both before and after the fall with full clarity. He reports feeling like he never fully got his strength back. At present he is able to ambulate with a cane which he uses intermittently although he feels below his baseline. His wife was concerned about dehydration as the neighbor reported he "didn't look himself." He has significantly increased his water intake since the fall. At time of examination, he reports continued weakness but overall feels "well." He does not some dysuria for the past couple days. He denies any fevers, chills, SOB, chest pain, nausea. Vomiting, constipation. Diarrhea.    In the ED: afebrile, VSS. Labs largely unremarkable. UA with 3+ leukocytes, >100 WBCs, many bacteria Xr chest without acute process. MRI lumbar spine without acute spinal complication, however was noted tohave L hydronephrosis. CT abdomen/pelvis w/ Isolated hydronephrosis of the left kidney terminating at what appears to be UPJ obstruction.    "

## 2025-08-04 NOTE — NURSING
Discharge orders are in. Ok by provider for discharge. Discharge education printed and explained. Advised patient of follow-up appts. Advised patient Rx will be delivered to the bedside. Patient gave a verbal understanding of discharge education.     Education complete  Care plan MET  IV line removed  Transportation ordered

## 2025-08-04 NOTE — HPI
Marbin Cortes is a 69 y.o. with pmh of cervical spondylosis and chronic lower back pain s/p lumbar spine surgery ~30 years ago who presents to the ED for fall. Patient reports falling to the ground while getting out of his car yesterday. He states it felt as though his legs gave out on him. Urology is consulted to work up left sided hydronephrosis and a possible chronic UPJ obstruction found incidentally on CT A/P.    Patient is a febrile, nontachycardic, and normotensive. WBC are 5, Cr 1.1 at his baseline. UA is positive for leuks and blood, negative for nitrites. Microscopy is positive for 9 RBC, >100 WBC, many bacteria.    Upon interview, patient is stable. He has minimal urological problems except dribbling which he has seen Dr. Winston.

## 2025-08-04 NOTE — NURSING
MD requesting RN to check to see of patient able to ambulate. Patient able to ambulate with cane from the room to the nursing station, no assistance needed. Patient did sway to the side a few times, mentioned that it does happen from time to time. MD notified. Safety precautions in place, bed locked low position, call light and bedside table near patient. Non-skid socks on patient feet. Care ongoing.

## 2025-08-05 ENCOUNTER — HOSPITAL ENCOUNTER (OUTPATIENT)
Facility: HOSPITAL | Age: 70
Discharge: HOME | End: 2025-08-06
Attending: STUDENT IN AN ORGANIZED HEALTH CARE EDUCATION/TRAINING PROGRAM | Admitting: STUDENT IN AN ORGANIZED HEALTH CARE EDUCATION/TRAINING PROGRAM
Payer: MEDICARE

## 2025-08-05 ENCOUNTER — PATIENT OUTREACH (OUTPATIENT)
Dept: ADMINISTRATIVE | Facility: CLINIC | Age: 70
End: 2025-08-05
Payer: MEDICARE

## 2025-08-05 DIAGNOSIS — R78.81 POSITIVE BLOOD CULTURES: Primary | ICD-10-CM

## 2025-08-05 DIAGNOSIS — R78.81 BACTEREMIA: ICD-10-CM

## 2025-08-05 DIAGNOSIS — N30.01 ACUTE CYSTITIS WITH HEMATURIA: ICD-10-CM

## 2025-08-05 DIAGNOSIS — R07.9 CHEST PAIN: ICD-10-CM

## 2025-08-05 DIAGNOSIS — Z13.6 SCREENING FOR CARDIOVASCULAR CONDITION: ICD-10-CM

## 2025-08-05 DIAGNOSIS — N13.30 HYDRONEPHROSIS, UNSPECIFIED HYDRONEPHROSIS TYPE: ICD-10-CM

## 2025-08-05 LAB
ABSOLUTE EOSINOPHIL (OHS): 0.09 K/UL
ABSOLUTE MONOCYTE (OHS): 0.4 K/UL (ref 0.3–1)
ABSOLUTE NEUTROPHIL COUNT (OHS): 3.63 K/UL (ref 1.8–7.7)
ALBUMIN SERPL BCP-MCNC: 3.2 G/DL (ref 3.5–5.2)
ALP SERPL-CCNC: 73 UNIT/L (ref 40–150)
ALT SERPL W/O P-5'-P-CCNC: 28 UNIT/L (ref 0–55)
ANION GAP (OHS): 9 MMOL/L (ref 8–16)
AST SERPL-CCNC: 38 UNIT/L (ref 0–50)
BASOPHILS # BLD AUTO: 0.05 K/UL
BASOPHILS NFR BLD AUTO: 1 %
BILIRUB SERPL-MCNC: 0.7 MG/DL (ref 0.1–1)
BIPAP: 0
BUN SERPL-MCNC: 9 MG/DL (ref 8–23)
CALCIUM SERPL-MCNC: 8.7 MG/DL (ref 8.7–10.5)
CHLORIDE SERPL-SCNC: 102 MMOL/L (ref 95–110)
CO2 SERPL-SCNC: 25 MMOL/L (ref 23–29)
CREAT SERPL-MCNC: 1 MG/DL (ref 0.5–1.4)
ERYTHROCYTE [DISTWIDTH] IN BLOOD BY AUTOMATED COUNT: 13.7 % (ref 11.5–14.5)
FIO2: 21 %
GFR SERPLBLD CREATININE-BSD FMLA CKD-EPI: >60 ML/MIN/1.73/M2
GLUCOSE SERPL-MCNC: 112 MG/DL (ref 70–110)
HCT VFR BLD AUTO: 50.4 % (ref 40–54)
HGB BLD-MCNC: 16.8 GM/DL (ref 14–18)
IMM GRANULOCYTES # BLD AUTO: 0.05 K/UL (ref 0–0.04)
IMM GRANULOCYTES NFR BLD AUTO: 1 % (ref 0–0.5)
LDH SERPL L TO P-CCNC: 1.2 MMOL/L (ref 0.5–2.2)
LYMPHOCYTES # BLD AUTO: 0.95 K/UL (ref 1–4.8)
MCH RBC QN AUTO: 28.9 PG (ref 27–31)
MCHC RBC AUTO-ENTMCNC: 33.3 G/DL (ref 32–36)
MCV RBC AUTO: 87 FL (ref 82–98)
NUCLEATED RBC (/100WBC) (OHS): 0 /100 WBC
OHS QRS DURATION: 92 MS
OHS QTC CALCULATION: 393 MS
PLATELET # BLD AUTO: 226 K/UL (ref 150–450)
PLATELET BLD QL SMEAR: NORMAL
PMV BLD AUTO: 9.8 FL (ref 9.2–12.9)
POC PERFORMED BY: NORMAL
POC TEMPERATURE: 37 C
POTASSIUM SERPL-SCNC: 3.9 MMOL/L (ref 3.5–5.1)
PROT SERPL-MCNC: 6.9 GM/DL (ref 6–8.4)
RBC # BLD AUTO: 5.82 M/UL (ref 4.6–6.2)
RELATIVE EOSINOPHIL (OHS): 1.7 %
RELATIVE LYMPHOCYTE (OHS): 18.4 % (ref 18–48)
RELATIVE MONOCYTE (OHS): 7.7 % (ref 4–15)
RELATIVE NEUTROPHIL (OHS): 70.2 % (ref 38–73)
SODIUM SERPL-SCNC: 136 MMOL/L (ref 136–145)
SPECIMEN SOURCE: NORMAL
WBC # BLD AUTO: 5.17 K/UL (ref 3.9–12.7)

## 2025-08-05 PROCEDURE — 84075 ASSAY ALKALINE PHOSPHATASE: CPT

## 2025-08-05 PROCEDURE — 25000003 PHARM REV CODE 250: Performed by: STUDENT IN AN ORGANIZED HEALTH CARE EDUCATION/TRAINING PROGRAM

## 2025-08-05 PROCEDURE — 83605 ASSAY OF LACTIC ACID: CPT

## 2025-08-05 PROCEDURE — 87040 BLOOD CULTURE FOR BACTERIA: CPT

## 2025-08-05 PROCEDURE — G0378 HOSPITAL OBSERVATION PER HR: HCPCS

## 2025-08-05 PROCEDURE — 99900035 HC TECH TIME PER 15 MIN (STAT)

## 2025-08-05 PROCEDURE — 63600175 PHARM REV CODE 636 W HCPCS

## 2025-08-05 PROCEDURE — 93010 ELECTROCARDIOGRAM REPORT: CPT | Mod: ,,, | Performed by: INTERNAL MEDICINE

## 2025-08-05 PROCEDURE — 93005 ELECTROCARDIOGRAM TRACING: CPT

## 2025-08-05 PROCEDURE — 25000003 PHARM REV CODE 250

## 2025-08-05 PROCEDURE — 82803 BLOOD GASES ANY COMBINATION: CPT

## 2025-08-05 PROCEDURE — 85025 COMPLETE CBC W/AUTO DIFF WBC: CPT

## 2025-08-05 RX ORDER — VITAMIN A 3000 MCG
2 CAPSULE ORAL DAILY
COMMUNITY

## 2025-08-05 RX ORDER — ACETAMINOPHEN 500 MG
1000 TABLET ORAL EVERY 8 HOURS PRN
Status: DISCONTINUED | OUTPATIENT
Start: 2025-08-05 | End: 2025-08-06 | Stop reason: HOSPADM

## 2025-08-05 RX ORDER — ONDANSETRON 8 MG/1
8 TABLET, ORALLY DISINTEGRATING ORAL EVERY 8 HOURS PRN
Status: DISCONTINUED | OUTPATIENT
Start: 2025-08-05 | End: 2025-08-06 | Stop reason: HOSPADM

## 2025-08-05 RX ORDER — TALC
6 POWDER (GRAM) TOPICAL NIGHTLY PRN
Status: DISCONTINUED | OUTPATIENT
Start: 2025-08-05 | End: 2025-08-06 | Stop reason: HOSPADM

## 2025-08-05 RX ORDER — IPRATROPIUM BROMIDE AND ALBUTEROL SULFATE 2.5; .5 MG/3ML; MG/3ML
3 SOLUTION RESPIRATORY (INHALATION) EVERY 4 HOURS PRN
Status: DISCONTINUED | OUTPATIENT
Start: 2025-08-05 | End: 2025-08-06 | Stop reason: HOSPADM

## 2025-08-05 RX ORDER — NALOXONE HCL 0.4 MG/ML
0.02 VIAL (ML) INJECTION
Status: DISCONTINUED | OUTPATIENT
Start: 2025-08-05 | End: 2025-08-06 | Stop reason: HOSPADM

## 2025-08-05 RX ORDER — CIPROFLOXACIN 500 MG/1
500 TABLET, FILM COATED ORAL 2 TIMES DAILY
Status: DISCONTINUED | OUTPATIENT
Start: 2025-08-05 | End: 2025-08-06 | Stop reason: HOSPADM

## 2025-08-05 RX ORDER — BISACODYL 10 MG/1
10 SUPPOSITORY RECTAL DAILY PRN
Status: DISCONTINUED | OUTPATIENT
Start: 2025-08-05 | End: 2025-08-06 | Stop reason: HOSPADM

## 2025-08-05 RX ORDER — METHOCARBAMOL 500 MG/1
500 TABLET, FILM COATED ORAL 3 TIMES DAILY PRN
Status: DISCONTINUED | OUTPATIENT
Start: 2025-08-05 | End: 2025-08-06 | Stop reason: HOSPADM

## 2025-08-05 RX ORDER — PROCHLORPERAZINE EDISYLATE 5 MG/ML
5 INJECTION INTRAMUSCULAR; INTRAVENOUS EVERY 6 HOURS PRN
Status: DISCONTINUED | OUTPATIENT
Start: 2025-08-05 | End: 2025-08-06 | Stop reason: HOSPADM

## 2025-08-05 RX ORDER — POLYETHYLENE GLYCOL 3350 17 G/17G
17 POWDER, FOR SOLUTION ORAL DAILY PRN
Status: DISCONTINUED | OUTPATIENT
Start: 2025-08-05 | End: 2025-08-06 | Stop reason: HOSPADM

## 2025-08-05 RX ORDER — IBUPROFEN 200 MG
24 TABLET ORAL
Status: DISCONTINUED | OUTPATIENT
Start: 2025-08-05 | End: 2025-08-06 | Stop reason: HOSPADM

## 2025-08-05 RX ORDER — SODIUM CHLORIDE 0.9 % (FLUSH) 0.9 %
5 SYRINGE (ML) INJECTION
Status: DISCONTINUED | OUTPATIENT
Start: 2025-08-05 | End: 2025-08-06 | Stop reason: HOSPADM

## 2025-08-05 RX ORDER — GLUCAGON 1 MG
1 KIT INJECTION
Status: DISCONTINUED | OUTPATIENT
Start: 2025-08-05 | End: 2025-08-06 | Stop reason: HOSPADM

## 2025-08-05 RX ORDER — ALUMINUM HYDROXIDE, MAGNESIUM HYDROXIDE, AND SIMETHICONE 1200; 120; 1200 MG/30ML; MG/30ML; MG/30ML
30 SUSPENSION ORAL 4 TIMES DAILY PRN
Status: DISCONTINUED | OUTPATIENT
Start: 2025-08-05 | End: 2025-08-06 | Stop reason: HOSPADM

## 2025-08-05 RX ORDER — SIMETHICONE 80 MG
1 TABLET,CHEWABLE ORAL 4 TIMES DAILY PRN
Status: DISCONTINUED | OUTPATIENT
Start: 2025-08-05 | End: 2025-08-06 | Stop reason: HOSPADM

## 2025-08-05 RX ORDER — ACETAMINOPHEN 325 MG/1
650 TABLET ORAL EVERY 4 HOURS PRN
Status: DISCONTINUED | OUTPATIENT
Start: 2025-08-05 | End: 2025-08-06 | Stop reason: HOSPADM

## 2025-08-05 RX ORDER — IBUPROFEN 200 MG
16 TABLET ORAL
Status: DISCONTINUED | OUTPATIENT
Start: 2025-08-05 | End: 2025-08-06 | Stop reason: HOSPADM

## 2025-08-05 RX ADMIN — Medication 1 CAPSULE: at 02:08

## 2025-08-05 RX ADMIN — VANCOMYCIN HYDROCHLORIDE 2000 MG: 500 INJECTION, POWDER, LYOPHILIZED, FOR SOLUTION INTRAVENOUS at 12:08

## 2025-08-05 RX ADMIN — CIPROFLOXACIN 500 MG: 500 TABLET ORAL at 08:08

## 2025-08-05 NOTE — ED TRIAGE NOTES
Pt reports he was discharged yesterday and referred back to ED to get IV antibiotics after + blood cultures. Pt reports no complaints at this time.

## 2025-08-05 NOTE — PROGRESS NOTES
Pharmacokinetic Initial Assessment: IV Vancomycin    Assessment/Plan:    Initiate intravenous vancomycin with loading dose of 2000 mg once followed by a maintenance dose of vancomycin 1000 mg IV every 12 hours  Desired empiric serum trough concentration is 15 to 20 mcg/mL  Draw vancomycin trough level 30 min prior to fourth dose on 8/7/25 at approximately 0000  Pharmacy will continue to follow and monitor vancomycin.      Please contact pharmacy at extension [phone] with any questions regarding this assessment.     Thank you for the consult,   Cierra Feliz       Patient brief summary:  Marbin Cortes is a 69 y.o. male initiated on antimicrobial therapy with IV Vancomycin for treatment of suspected bacteremia    Drug Allergies:   Review of patient's allergies indicates:  No Known Allergies    Actual Body Weight:   86.2 kg    Renal Function:   Estimated Creatinine Clearance: 79.9 mL/min (based on SCr of 1 mg/dL).,     Dialysis Method (if applicable):  N/A    CBC (last 72 hours):  Recent Labs   Lab Result Units 08/03/25  1524 08/04/25  0407 08/05/25  1129   WBC K/uL 6.92 5.31 5.17   HGB gm/dL 16.1 13.8* 16.8   HCT % 49.0 41.3 50.4   Platelet Count K/uL 173 165 226   Lymph % % 10.5* 20.0 18.4   Mono % % 9.5 13.7 7.7   Eos % % 1.2 1.9 1.7   Basophil % % 0.6 0.6 1.0       Metabolic Panel (last 72 hours):  Recent Labs   Lab Result Units 08/03/25  1524 08/03/25  1731 08/04/25  0407 08/05/25  1239   Sodium mmol/L 134*  --  133* 136   Potassium mmol/L 4.8  --  3.5 3.9   Chloride mmol/L 100  --  101 102   CO2 mmol/L 21*  --  22* 25   Glucose mg/dL 127*  --  107 112*   Glucose, UA   --  Negative  --   --    BUN mg/dL 17  --  16 9   Creatinine mg/dL 1.2  --  1.1 1.0   Albumin g/dL 3.7  --  2.9* 3.2*   Bilirubin Total mg/dL 1.1*  --  0.7 0.7   ALP unit/L 73  --  59 73   AST unit/L 68*  --  39 38   ALT unit/L 47  --  21 28   Magnesium  mg/dL 2.2  --  2.0  --    Phosphorus Level mg/dL  --   --  2.5*  --        Drug  "levels (last 3 results):  No results for input(s): "VANCOMYCINRA", "VANCORANDOM", "VANCOMYCINPE", "VANCOPEAK", "VANCOMYCINTR", "VANCOTROUGH" in the last 72 hours.    Microbiologic Results:  Microbiology Results (last 7 days)       Procedure Component Value Units Date/Time    Blood culture #2 **CANNOT BE ORDERED STAT** [[id number]] Collected: 08/05/25 1129    Order Status: Resulted Specimen: Blood from Peripheral, Hand, Left Updated: 08/05/25 1204    Blood culture #1 **CANNOT BE ORDERED STAT** [[id number]] Collected: 08/05/25 1129    Order Status: Resulted Specimen: Blood from Peripheral, Antecubital, Left Updated: 08/05/25 1204    Blood culture x two cultures. Draw prior to antibiotics. [[id number]]     Order Status: Canceled Specimen: Blood     Blood culture x two cultures. Draw prior to antibiotics. [[id number]]     Order Status: Canceled Specimen: Blood             "

## 2025-08-05 NOTE — ASSESSMENT & PLAN NOTE
- Bcx from previous admit GPC, GPR  - PCR Staph spp  - possible contaminant but both bottles+  - repeat Bcx  - start vanc  - ECHO  - ID consulted

## 2025-08-05 NOTE — ED PROVIDER NOTES
Encounter Date: 8/5/2025       History     Chief Complaint   Patient presents with    doctor referral     Pt recently discharge and was told to come back by his doctor to come back for IV antibiotics, was recently admitted to obs for hydronephrosis and UTI     69-year-old male with pmh of cervical spondylosis and chronic lower back pain s/p lumbar spine surgery ~30 years ago who presents to the ED for positive blood cultures. Was recently admitted for fall. Found to have hydronephrosis on imaging, urology cleared for outpt f/u, discharged on oral cipro for UTI. Bcx were negative at discharge, clinically stable without signs of sepsis. Overnight Bcx+ GPC and GPR. He was called to return for admission for bacteremia r/o.  He denies any symptoms at this time.  No fevers.    The history is provided by the patient and medical records.     Review of patient's allergies indicates:  No Known Allergies  Past Medical History:   Diagnosis Date    GERD (gastroesophageal reflux disease)      Past Surgical History:   Procedure Laterality Date    HERNIA REPAIR      SPINE SURGERY      UPPER GASTROINTESTINAL ENDOSCOPY       Family History   Problem Relation Name Age of Onset    Stroke Mother      Diabetes Mother      Stroke Father      Cancer Paternal Grandmother      Liver disease Paternal Grandmother      Colon cancer Neg Hx      Stomach cancer Neg Hx       Social History[1]  Review of Systems  See HPI  Physical Exam     Initial Vitals [08/05/25 1006]   BP Pulse Resp Temp SpO2   137/86 82 20 97.7 °F (36.5 °C) 97 %      MAP       --         Physical Exam    Vitals reviewed.  Constitutional: He appears well-developed and well-nourished. He is not diaphoretic. No distress.   HENT:   Head: Normocephalic and atraumatic.   Neck: Neck supple.   Cardiovascular:  Normal rate, regular rhythm and normal heart sounds.     Exam reveals no gallop and no friction rub.       No murmur heard.  Pulmonary/Chest: Breath sounds normal. No respiratory  distress. He has no wheezes. He has no rhonchi. He has no rales.   Abdominal: Abdomen is soft. There is no abdominal tenderness.   No right CVA tenderness.  No left CVA tenderness.   Musculoskeletal:      Cervical back: Neck supple.     Neurological: He is alert and oriented to person, place, and time.   Psychiatric: He has a normal mood and affect.         ED Course   Procedures  Labs Reviewed   COMPREHENSIVE METABOLIC PANEL - Abnormal       Result Value    Sodium 136      Potassium 3.9      Chloride 102      CO2 25      Glucose 112 (*)     BUN 9      Creatinine 1.0      Calcium 8.7      Protein Total 6.9      Albumin 3.2 (*)     Bilirubin Total 0.7      ALP 73      AST 38      ALT 28      Anion Gap 9      eGFR >60     CBC WITH DIFFERENTIAL - Abnormal    WBC 5.17      RBC 5.82      HGB 16.8      HCT 50.4      MCV 87      MCH 28.9      MCHC 33.3      RDW 13.7      Platelet Count 226      MPV 9.8      Nucleated RBC 0      Neut % 70.2      Lymph % 18.4      Mono % 7.7      Eos % 1.7      Basophil % 1.0      Imm Grans % 1.0 (*)     Neut # 3.63      Lymph # 0.95 (*)     Mono # 0.40      Eos # 0.09      Baso # 0.05      Imm Grans # 0.05 (*)    PLATELET REVIEW - Normal    Platelet Estimate Appears Normal     CBC W/ AUTO DIFFERENTIAL    Narrative:     The following orders were created for panel order CBC auto differential.  Procedure                               Abnormality         Status                     ---------                               -----------         ------                     CBC with Differential[[id number]]       Abnormal            Final result                 Please view results for these tests on the individual orders.        ECG Results              EKG 12-lead (Final result)        Collection Time Result Time QRS Duration OHS QTC Calculation    08/05/25 11:44:00 08/05/25 13:00:41 92 393                     Final result by Interface, Lab In [facility] (08/05/25 13:00:47)                   Narrative:     Test Reason : Z13.6,    Vent. Rate :  71 BPM     Atrial Rate :  71 BPM     P-R Int : 176 ms          QRS Dur :  92 ms      QT Int : 362 ms       P-R-T Axes :  24 -10 -19 degrees    QTcB Int : 393 ms    Normal sinus rhythm  Probable  Inferior infarct (cited on or before 03-Aug-2025)  Anterolateral infarct (cited on or before 03-Aug-2025)  Abnormal ECG  When compared with ECG of 03-Aug-2025 13:54,  Vent. rate has decreased by  36 bpm  Questionable change in initial forces of Inferior leads  Inverted T waves have replaced nonspecific T wave abnormality in Inferior  leads  Confirmed by David Gusman ([id number]) on 8/5/2025 1:00:38 PM    Referred By: AAAREFERRAL SELF           Confirmed By: David Gusman                                  Imaging Results    None          Medications   methocarbamoL tablet 500 mg (has no administration in time range)   Lactobacillus rhamnosus GG capsule 1 capsule (1 capsule Oral Given 8/5/25 1422)   sodium chloride 0.9% flush 5 mL (has no administration in time range)   albuterol-ipratropium 2.5 mg-0.5 mg/3 mL nebulizer solution 3 mL (has no administration in time range)   melatonin tablet 6 mg (0 mg Oral Return to Cabinet 8/5/25 1932)   ondansetron disintegrating tablet 8 mg (has no administration in time range)   prochlorperazine injection Soln 5 mg (has no administration in time range)   polyethylene glycol packet 17 g (has no administration in time range)   bisacodyL suppository 10 mg (has no administration in time range)   simethicone chewable tablet 80 mg (has no administration in time range)   aluminum-magnesium hydroxide-simethicone 200-200-20 mg/5 mL suspension 30 mL (has no administration in time range)   acetaminophen tablet 650 mg (has no administration in time range)   acetaminophen tablet 1,000 mg (has no administration in time range)   naloxone 0.4 mg/mL injection 0.02 mg (has no administration in time range)   glucose chewable tablet 16 g (has no administration in time range)    glucose chewable tablet 24 g (has no administration in time range)   dextrose 50% injection 12.5 g (has no administration in time range)   dextrose 50% injection 25 g (has no administration in time range)   glucagon (human recombinant) injection 1 mg (has no administration in time range)   vancomycin - pharmacy to dose (has no administration in time range)   ciprofloxacin HCl tablet 500 mg (500 mg Oral Given 8/5/25 2049)   vancomycin (VANCOCIN) 1,000 mg in D5W 250 mL IVPB (admixture device) (0 mg Intravenous Stopped 8/6/25 0152)   vancomycin 2 g in dextrose 5 % 500 mL IVPB (0 mg Intravenous Stopped 8/5/25 1448)     Medical Decision Making  Emergent evaluation of 69-year-old male regarding positive blood cultures.  Asymptomatic.  Vitals WNL.  Well-appearing, in no acute distress.  See physical exam findings above.  Will repeat workup and plan for admission for IV antibiotics.    My differential diagnoses include but are not limited to: Urosepsis, bacteremia, possible contaminant  See ED course.  I have reviewed the patient's records and discussed with my supervising physician.    Amount and/or Complexity of Data Reviewed  Labs: ordered.    Risk  Prescription drug management.               ED Course as of 08/06/25 0727   Tue Aug 05, 2025   1157 EKG with sinus rhythm, rate 71, no STEMI [NN]      ED Course User Index  [NN] Cande Rivera MD                               Clinical Impression:  Final diagnoses:  [Z13.6] Screening for cardiovascular condition  [R78.81] Bacteremia  [R78.81] Positive blood cultures (Primary)  [N30.01] Acute cystitis with hematuria  [N13.30] Hydronephrosis, unspecified hydronephrosis type          ED Disposition Condition    Observation                       [1]   Social History  Tobacco Use    Smoking status: Never    Smokeless tobacco: Never   Vaping Use    Vaping status: Never Used   Substance Use Topics    Alcohol use: No    Drug use: No        Raeann Wright PA-C  08/06/25  [id number]

## 2025-08-05 NOTE — ASSESSMENT & PLAN NOTE
- previously noted on imaging  - f/u with urology as previously scheduled  - renal function stable

## 2025-08-05 NOTE — PLAN OF CARE
Problem: Hospitalized Older Adult  Goal: Optimal Cognitive Function  Outcome: Progressing  Goal: Effective Bowel Elimination  Outcome: Progressing  Goal: Optimal Coping  Outcome: Progressing  Goal: Fluid and Electrolyte Balance  Outcome: Progressing  Goal: Optimal Functional Ability  Outcome: Progressing  Goal: Improved Oral Intake  Outcome: Progressing  Goal: Adequate Sleep/Rest  Outcome: Progressing  Goal: Effective Urinary Elimination  Outcome: Progressing     Problem: Adult Inpatient Plan of Care  Goal: Plan of Care Review  Outcome: Progressing  Goal: Patient-Specific Goal (Individualized)  Outcome: Progressing  Goal: Absence of Hospital-Acquired Illness or Injury  Outcome: Progressing  Goal: Optimal Comfort and Wellbeing  Outcome: Progressing     Problem: Infection  Goal: Absence of Infection Signs and Symptoms  Outcome: Progressing     Problem: Fall Injury Risk  Goal: Absence of Fall and Fall-Related Injury  Outcome: Progressing

## 2025-08-05 NOTE — H&P
[facility] - Observation [address] [place] [address] Medicine  History & Physical    Patient Name: Marbin Cortes  MRN: [id number]  Patient Class: OP- Observation  Admission Date: 8/5/2025  Attending Physician: Syeda Edmond MD   Primary Care Provider: Denise Primary Doctor         Patient information was obtained from patient and ER records.     Subjective:     Principal Problem:Bacteremia    Chief Complaint:   Chief Complaint   Patient presents with    doctor referral     Pt recently discharge and was told to come back by his doctor to come back for IV antibiotics, was recently admitted to obs for hydronephrosis and UTI        HPI: 69 y.o. with pmh of cervical spondylosis and chronic lower back pain s/p lumbar spine surgery ~30 years ago recently admitted for fall. Found to have hydronephrosis on imaging, urology cleared for outpt f/u, discharged on oral cipro for UTI. Bcx were negative at discharge, clinically stable without signs of sepsis. Overnight Bcx+ GPC and GPR. Called patient to return for admission for bacteremia r/o.     Past Medical History:   Diagnosis Date    GERD (gastroesophageal reflux disease)        Past Surgical History:   Procedure Laterality Date    HERNIA REPAIR      SPINE SURGERY      UPPER GASTROINTESTINAL ENDOSCOPY         Review of patient's allergies indicates:  No Known Allergies    Current Facility-Administered Medications on File Prior to Encounter   Medication    [DISCONTINUED] acetaminophen tablet 650 mg    [DISCONTINUED] cefTRIAXone injection 1 g    [DISCONTINUED] dextrose 50% injection 12.5 g    [DISCONTINUED] dextrose 50% injection 25 g    [DISCONTINUED] glucagon (human recombinant) injection 1 mg    [DISCONTINUED] glucose chewable tablet 16 g    [DISCONTINUED] glucose chewable tablet 24 g    [DISCONTINUED] melatonin tablet 6 mg    [DISCONTINUED] naloxone 0.4 mg/mL injection 0.02 mg    [DISCONTINUED] ondansetron disintegrating tablet 8 mg    [DISCONTINUED] sodium chloride 0.9% flush 10 mL      Current Outpatient Medications on File Prior to Encounter   Medication Sig    acetaminophen (TYLENOL) 500 MG tablet Take 500 mg by mouth every 6 (six) hours as needed for Pain.    ciprofloxacin HCl (CIPRO) 500 MG tablet Take 1 tablet (500 mg total) by mouth 2 (two) times daily. for 10 days    ibuprofen (ADVIL,MOTRIN) 200 MG tablet Take 200 mg by mouth every 6 (six) hours as needed for Pain.    Lactobacillus rhamnosus GG (CULTURELLE) 10 billion cell capsule Take 1 capsule by mouth once daily. for 10 days    sodium chloride (SALINE NASAL) 0.65 % nasal spray 2 sprays by Nasal route once daily.    [DISCONTINUED] methocarbamoL (ROBAXIN) 500 MG Tab Take 1 tablet (500 mg total) by mouth 3 (three) times daily as needed (neck pain).     Family History       Problem Relation (Age of Onset)    Cancer Paternal Grandmother    Diabetes Mother    Liver disease Paternal Grandmother    Stroke Mother, Father          Tobacco Use    Smoking status: Never    Smokeless tobacco: Never   Vaping Use    Vaping status: Never Used   Substance and Sexual Activity    Alcohol use: No    Drug use: No    Sexual activity: Not on file     Review of Systems   Constitutional:  Negative for fever.   HENT:  Negative for trouble swallowing.    Respiratory:  Negative for shortness of breath.    Cardiovascular:  Negative for chest pain.   Gastrointestinal:  Negative for abdominal pain, nausea and vomiting.   Genitourinary:  Negative for dysuria.   Musculoskeletal:  Positive for back pain (chronic).   Skin:  Negative for wound.   Neurological:  Negative for light-headedness.   Psychiatric/Behavioral:  Negative for confusion.      Objective:     Vital Signs (Most Recent):  Temp: (P) 97.6 °F (36.4 °C) (08/05/25 1631)  Pulse: 69 (08/05/25 1637)  Resp: 20 (08/05/25 1637)  BP: 134/77 (08/05/25 1637)  SpO2: 97 % (08/05/25 1006) Vital Signs (24h Range):  Temp:  [97.6 °F (36.4 °C)-97.7 °F (36.5 °C)] (P) 97.6 °F (36.4 °C)  Pulse:  [69-82] 69  Resp:  [20]  20  SpO2:  [97 %] 97 %  BP: (134-137)/(77-86) 134/77     Weight: 86.2 kg (190 lb)  Body mass index is 25.07 kg/m².     Physical Exam  Vitals and nursing note reviewed.   Constitutional:       General: He is not in acute distress.     Appearance: Normal appearance.   HENT:      Head: Normocephalic and atraumatic.      Right Ear: External ear normal.      Left Ear: External ear normal.      Nose: Nose normal.      Mouth/Throat:      Mouth: Mucous membranes are moist.      Pharynx: Oropharynx is clear.   Eyes:      Extraocular Movements: Extraocular movements intact.      Conjunctiva/sclera: Conjunctivae normal.      Pupils: Pupils are equal, round, and reactive to light.   Cardiovascular:      Rate and Rhythm: Normal rate and regular rhythm.      Pulses: Normal pulses.      Heart sounds: Normal heart sounds.   Pulmonary:      Effort: Pulmonary effort is normal.      Breath sounds: Normal breath sounds.   Abdominal:      General: Abdomen is flat. Bowel sounds are normal.      Palpations: Abdomen is soft.   Musculoskeletal:         General: Normal range of motion.      Cervical back: Normal range of motion and neck supple.   Skin:     General: Skin is warm and dry.   Neurological:      General: No focal deficit present.      Mental Status: He is alert and oriented to person, place, and time.   Psychiatric:         Mood and Affect: Mood normal.         Behavior: Behavior normal.              CRANIAL NERVES     CN III, IV, VI   Pupils are equal, round, and reactive to light.       Significant Labs: All pertinent labs within the past 24 hours have been reviewed.    Significant Imaging: I have reviewed all pertinent imaging results/findings within the past 24 hours.  Assessment/Plan:     Assessment & Plan  Bacteremia  - Bcx from previous admit GPC, GPR  - PCR Staph spp  - possible contaminant but both bottles+  - repeat Bcx  - start vanc  - ECHO  - ID consulted    UTI (urinary tract infection)  - prior Ucx GNR   - cont  cipro    Hydronephrosis  Ureteropelvic junction (UPJ) obstruction, left  - previously noted on imaging  - f/u with urology as previously scheduled  - renal function stable         VTE Risk Mitigation (From admission, onward)           Ordered     IP VTE LOW RISK PATIENT  Once         08/05/25 1301     Place sequential compression device  Until discontinued         08/05/25 1301                                   On 08/05/2025, patient should be placed in hospital observation services under my care.        Pharmacokinetic Initial Assessment: IV Vancomycin    Assessment/Plan:    Initiate intravenous vancomycin with loading dose of 2000 mg once followed by a maintenance dose of vancomycin 1000 mg IV every 12 hours  Desired empiric serum trough concentration is 15 to 20 mcg/mL  Draw vancomycin trough level 30 min prior to fourth dose on 8/7/25 at approximately 0000  Pharmacy will continue to follow and monitor vancomycin.      Please contact pharmacy at extension [phone] with any questions regarding this assessment.     Thank you for the consult,   Cierra Feliz       Patient brief summary:  Marbin Cortes is a 69 y.o. male initiated on antimicrobial therapy with IV Vancomycin for treatment of suspected bacteremia    Drug Allergies:   Review of patient's allergies indicates:  No Known Allergies    Actual Body Weight:   86.2 kg    Renal Function:   Estimated Creatinine Clearance: 79.9 mL/min (based on SCr of 1 mg/dL).,     Dialysis Method (if applicable):  N/A    CBC (last 72 hours):  Recent Labs   Lab Result Units 08/03/25  1524 08/04/25  0407 08/05/25  1129   WBC K/uL 6.92 5.31 5.17   HGB gm/dL 16.1 13.8* 16.8   HCT % 49.0 41.3 50.4   Platelet Count K/uL 173 165 226   Lymph % % 10.5* 20.0 18.4   Mono % % 9.5 13.7 7.7   Eos % % 1.2 1.9 1.7   Basophil % % 0.6 0.6 1.0       Metabolic Panel (last 72 hours):  Recent Labs   Lab Result Units 08/03/25  1524 08/03/25  1731 08/04/25  0407 08/05/25  1239   Sodium mmol/L  "134*  --  133* 136   Potassium mmol/L 4.8  --  3.5 3.9   Chloride mmol/L 100  --  101 102   CO2 mmol/L 21*  --  22* 25   Glucose mg/dL 127*  --  107 112*   Glucose, UA   --  Negative  --   --    BUN mg/dL 17  --  16 9   Creatinine mg/dL 1.2  --  1.1 1.0   Albumin g/dL 3.7  --  2.9* 3.2*   Bilirubin Total mg/dL 1.1*  --  0.7 0.7   ALP unit/L 73  --  59 73   AST unit/L 68*  --  39 38   ALT unit/L 47  --  21 28   Magnesium  mg/dL 2.2  --  2.0  --    Phosphorus Level mg/dL  --   --  2.5*  --        Drug levels (last 3 results):  No results for input(s): "VANCOMYCINRA", "VANCORANDOM", "VANCOMYCINPE", "VANCOPEAK", "VANCOMYCINTR", "VANCOTROUGH" in the last 72 hours.    Microbiologic Results:  Microbiology Results (last 7 days)       Procedure Component Value Units Date/Time    Blood culture #2 **CANNOT BE ORDERED STAT** [[id number]] Collected: 08/05/25 1129    Order Status: Resulted Specimen: Blood from Peripheral, Hand, Left Updated: 08/05/25 1204    Blood culture #1 **CANNOT BE ORDERED STAT** [[id number]] Collected: 08/05/25 1129    Order Status: Resulted Specimen: Blood from Peripheral, Antecubital, Left Updated: 08/05/25 1204    Blood culture x two cultures. Draw prior to antibiotics. [[id number]]     Order Status: Canceled Specimen: Blood     Blood culture x two cultures. Draw prior to antibiotics. [[id number]]     Order Status: Canceled Specimen: Blood               Syeda Edmond MD  Department of Hospital Medicine  [facility] - Observation 11H          "

## 2025-08-05 NOTE — PHARMACY MED REC
"Admission Medication History     The home medication history was taken by Rosalina Campos.    You may go to "Admission" then "Reconcile Home Medications" tabs to review and/or act upon these items.     The home medication list has been updated by the Pharmacy department.   Please read ALL comments highlighted in yellow.   Please address this information as you see fit.    Feel free to contact us if you have any questions or require assistance.      The medications listed below were removed from the home medication list. Please reorder if appropriate:  Patient reports no longer taking the following medication(s):  METHOCARBAMOL 500 MG    Medications listed below were obtained from: Patient/family and Analytic software- [employer]    Current Outpatient Medications on File Prior to Encounter   Medication Sig    acetaminophen (TYLENOL) 500 MG tablet   Take 500 mg by mouth every 6 (six) hours as needed for Pain.    ciprofloxacin HCl (CIPRO) 500 MG tablet   Take 1 tablet (500 mg total) by mouth 2 (two) times daily. for 10 days    ibuprofen (ADVIL,MOTRIN) 200 MG tablet   Take 200 mg by mouth every 6 (six) hours as needed for Pain.    Lactobacillus rhamnosus GG (CULTURELLE) 10 billion cell capsule   Take 1 capsule by mouth once daily. for 10 days    sodium chloride (SALINE NASAL) 0.65 % nasal spray   2 sprays by Nasal route once daily.             Rosalina Campos  [id number]                  .          "

## 2025-08-05 NOTE — HPI
69 y.o. with pmh of cervical spondylosis and chronic lower back pain s/p lumbar spine surgery ~30 years ago recently admitted for fall. Found to have hydronephrosis on imaging, urology cleared for outpt f/u, discharged on oral cipro for UTI. Bcx were negative at discharge, clinically stable without signs of sepsis. Overnight Bcx+ GPC and GPR. Called patient to return for admission for bacteremia r/o.

## 2025-08-05 NOTE — ED NOTES
Telemetry Verification   Patient placed on Telemetry Box  Verified with War Room  Box # [id number]   Monitor Tech    Rate    Rhythm

## 2025-08-05 NOTE — SUBJECTIVE & OBJECTIVE
Past Medical History:   Diagnosis Date    GERD (gastroesophageal reflux disease)        Past Surgical History:   Procedure Laterality Date    HERNIA REPAIR      SPINE SURGERY      UPPER GASTROINTESTINAL ENDOSCOPY         Review of patient's allergies indicates:  No Known Allergies    Current Facility-Administered Medications on File Prior to Encounter   Medication    [DISCONTINUED] acetaminophen tablet 650 mg    [DISCONTINUED] cefTRIAXone injection 1 g    [DISCONTINUED] dextrose 50% injection 12.5 g    [DISCONTINUED] dextrose 50% injection 25 g    [DISCONTINUED] glucagon (human recombinant) injection 1 mg    [DISCONTINUED] glucose chewable tablet 16 g    [DISCONTINUED] glucose chewable tablet 24 g    [DISCONTINUED] melatonin tablet 6 mg    [DISCONTINUED] naloxone 0.4 mg/mL injection 0.02 mg    [DISCONTINUED] ondansetron disintegrating tablet 8 mg    [DISCONTINUED] sodium chloride 0.9% flush 10 mL     Current Outpatient Medications on File Prior to Encounter   Medication Sig    acetaminophen (TYLENOL) 500 MG tablet Take 500 mg by mouth every 6 (six) hours as needed for Pain.    ciprofloxacin HCl (CIPRO) 500 MG tablet Take 1 tablet (500 mg total) by mouth 2 (two) times daily. for 10 days    ibuprofen (ADVIL,MOTRIN) 200 MG tablet Take 200 mg by mouth every 6 (six) hours as needed for Pain.    Lactobacillus rhamnosus GG (CULTURELLE) 10 billion cell capsule Take 1 capsule by mouth once daily. for 10 days    sodium chloride (SALINE NASAL) 0.65 % nasal spray 2 sprays by Nasal route once daily.    [DISCONTINUED] methocarbamoL (ROBAXIN) 500 MG Tab Take 1 tablet (500 mg total) by mouth 3 (three) times daily as needed (neck pain).     Family History       Problem Relation (Age of Onset)    Cancer Paternal Grandmother    Diabetes Mother    Liver disease Paternal Grandmother    Stroke Mother, Father          Tobacco Use    Smoking status: Never    Smokeless tobacco: Never   Vaping Use    Vaping status: Never Used   Substance and  Sexual Activity    Alcohol use: No    Drug use: No    Sexual activity: Not on file     Review of Systems   Constitutional:  Negative for fever.   HENT:  Negative for trouble swallowing.    Respiratory:  Negative for shortness of breath.    Cardiovascular:  Negative for chest pain.   Gastrointestinal:  Negative for abdominal pain, nausea and vomiting.   Genitourinary:  Negative for dysuria.   Musculoskeletal:  Positive for back pain (chronic).   Skin:  Negative for wound.   Neurological:  Negative for light-headedness.   Psychiatric/Behavioral:  Negative for confusion.      Objective:     Vital Signs (Most Recent):  Temp: (P) 97.6 °F (36.4 °C) (08/05/25 1631)  Pulse: 69 (08/05/25 1637)  Resp: 20 (08/05/25 1637)  BP: 134/77 (08/05/25 1637)  SpO2: 97 % (08/05/25 1006) Vital Signs (24h Range):  Temp:  [97.6 °F (36.4 °C)-97.7 °F (36.5 °C)] (P) 97.6 °F (36.4 °C)  Pulse:  [69-82] 69  Resp:  [20] 20  SpO2:  [97 %] 97 %  BP: (134-137)/(77-86) 134/77     Weight: 86.2 kg (190 lb)  Body mass index is 25.07 kg/m².     Physical Exam  Vitals and nursing note reviewed.   Constitutional:       General: He is not in acute distress.     Appearance: Normal appearance.   HENT:      Head: Normocephalic and atraumatic.      Right Ear: External ear normal.      Left Ear: External ear normal.      Nose: Nose normal.      Mouth/Throat:      Mouth: Mucous membranes are moist.      Pharynx: Oropharynx is clear.   Eyes:      Extraocular Movements: Extraocular movements intact.      Conjunctiva/sclera: Conjunctivae normal.      Pupils: Pupils are equal, round, and reactive to light.   Cardiovascular:      Rate and Rhythm: Normal rate and regular rhythm.      Pulses: Normal pulses.      Heart sounds: Normal heart sounds.   Pulmonary:      Effort: Pulmonary effort is normal.      Breath sounds: Normal breath sounds.   Abdominal:      General: Abdomen is flat. Bowel sounds are normal.      Palpations: Abdomen is soft.   Musculoskeletal:          General: Normal range of motion.      Cervical back: Normal range of motion and neck supple.   Skin:     General: Skin is warm and dry.   Neurological:      General: No focal deficit present.      Mental Status: He is alert and oriented to person, place, and time.   Psychiatric:         Mood and Affect: Mood normal.         Behavior: Behavior normal.              CRANIAL NERVES     CN III, IV, VI   Pupils are equal, round, and reactive to light.       Significant Labs: All pertinent labs within the past 24 hours have been reviewed.    Significant Imaging: I have reviewed all pertinent imaging results/findings within the past 24 hours.

## 2025-08-05 NOTE — NURSING
Patient arrived via stretcher from ER on stretcher on RA, no pain no difficulty breathing Saline lock to left AC # 20, no abd pain, no pain with voiding, last bm 8/5/202, wife at bedside

## 2025-08-06 VITALS
HEART RATE: 69 BPM | WEIGHT: 194.25 LBS | TEMPERATURE: 98 F | HEIGHT: 73 IN | DIASTOLIC BLOOD PRESSURE: 82 MMHG | BODY MASS INDEX: 25.74 KG/M2 | SYSTOLIC BLOOD PRESSURE: 161 MMHG | RESPIRATION RATE: 18 BRPM | OXYGEN SATURATION: 97 %

## 2025-08-06 LAB
ABSOLUTE EOSINOPHIL (OHS): 0.21 K/UL
ABSOLUTE MONOCYTE (OHS): 0.53 K/UL (ref 0.3–1)
ABSOLUTE NEUTROPHIL COUNT (OHS): 3.53 K/UL (ref 1.8–7.7)
ALBUMIN SERPL BCP-MCNC: 3 G/DL (ref 3.5–5.2)
ALP SERPL-CCNC: 74 UNIT/L (ref 40–150)
ALT SERPL W/O P-5'-P-CCNC: 30 UNIT/L (ref 0–55)
ANION GAP (OHS): 9 MMOL/L (ref 8–16)
AORTIC SIZE INDEX (SOV): 1.7 CM/M2
AORTIC SIZE INDEX: 1.7 CM/M2
ASCENDING AORTA: 3.6 CM
AST SERPL-CCNC: 37 UNIT/L (ref 0–50)
AV AREA BY CONTINUOUS VTI: 3.2 CM2
AV INDEX (PROSTH): 0.96
AV LVOT MEAN GRADIENT: 2 MMHG
AV LVOT PEAK GRADIENT: 4 MMHG
AV MEAN GRADIENT: 3 MMHG
AV PEAK GRADIENT: 5 MMHG
AV VALVE AREA BY VELOCITY RATIO: 3.1 CM²
AV VALVE AREA: 3.3 CM2
AV VELOCITY RATIO: 0.91
BACTERIA UR CULT: ABNORMAL
BACTERIA UR CULT: NORMAL
BASOPHILS # BLD AUTO: 0.08 K/UL
BASOPHILS NFR BLD AUTO: 1.4 %
BILIRUB SERPL-MCNC: 0.6 MG/DL (ref 0.1–1)
BSA FOR ECHO PROCEDURE: 2.13 M2
BUN SERPL-MCNC: 9 MG/DL (ref 8–23)
CALCIUM SERPL-MCNC: 8.3 MG/DL (ref 8.7–10.5)
CHLORIDE SERPL-SCNC: 105 MMOL/L (ref 95–110)
CO2 SERPL-SCNC: 24 MMOL/L (ref 23–29)
CREAT SERPL-MCNC: 1 MG/DL (ref 0.5–1.4)
CV ECHO LV RWT: 0.54 CM
DOP CALC AO PEAK VEL: 1.1 M/S
DOP CALC AO VTI: 21.8 CM
DOP CALC LVOT AREA: 3.5 CM2
DOP CALC LVOT DIAMETER: 2.1 CM
DOP CALC LVOT PEAK VEL: 1 M/S
DOP CALCLVOT PEAK VEL VTI: 21 CM
E WAVE DECELERATION TIME: 161 MS
E/A RATIO: 0.9
E/E' RATIO: 7 M/S
ECHO EF ESTIMATED: 61 %
ECHO LV POSTERIOR WALL: 1.1 CM (ref 0.6–1.1)
EJECTION FRACTION: 60 %
ERYTHROCYTE [DISTWIDTH] IN BLOOD BY AUTOMATED COUNT: 13.6 % (ref 11.5–14.5)
FRACTIONAL SHORTENING: 34.1 % (ref 28–44)
GFR SERPLBLD CREATININE-BSD FMLA CKD-EPI: >60 ML/MIN/1.73/M2
GLUCOSE SERPL-MCNC: 108 MG/DL (ref 70–110)
HCT VFR BLD AUTO: 43.9 % (ref 40–54)
HGB BLD-MCNC: 14.3 GM/DL (ref 14–18)
HOLD SPECIMEN: NORMAL
IMM GRANULOCYTES # BLD AUTO: 0.1 K/UL (ref 0–0.04)
IMM GRANULOCYTES NFR BLD AUTO: 1.7 % (ref 0–0.5)
INTERVENTRICULAR SEPTUM: 1.1 CM (ref 0.6–1.1)
IVC DIAMETER: 1.38 CM
IVRT: 106 MS
LA MAJOR: 4.5 CM
LA MINOR: 4.5 CM
LA WIDTH: 3.5 CM
LEFT ATRIUM SIZE: 3.3 CM
LEFT ATRIUM VOLUME INDEX MOD: 19 ML/M2
LEFT ATRIUM VOLUME INDEX: 21 ML/M2
LEFT ATRIUM VOLUME MOD: 41 ML
LEFT ATRIUM VOLUME: 44 CM3
LEFT INTERNAL DIMENSION IN SYSTOLE: 2.7 CM (ref 2.1–4)
LEFT VENTRICLE DIASTOLIC VOLUME INDEX: 33.8 ML/M2
LEFT VENTRICLE DIASTOLIC VOLUME: 72 ML
LEFT VENTRICLE MASS INDEX: 71 G/M2
LEFT VENTRICLE SYSTOLIC VOLUME INDEX: 13.1 ML/M2
LEFT VENTRICLE SYSTOLIC VOLUME: 28 ML
LEFT VENTRICULAR INTERNAL DIMENSION IN DIASTOLE: 4.1 CM (ref 3.5–6)
LEFT VENTRICULAR MASS: 151.3 G
LV LATERAL E/E' RATIO: 7
LV SEPTAL E/E' RATIO: 7
LYMPHOCYTES # BLD AUTO: 1.33 K/UL (ref 1–4.8)
Lab: 1.9 CM/M
Lab: 2 CM/M
MAGNESIUM SERPL-MCNC: 2 MG/DL (ref 1.6–2.6)
MCH RBC QN AUTO: 28.8 PG (ref 27–31)
MCHC RBC AUTO-ENTMCNC: 32.6 G/DL (ref 32–36)
MCV RBC AUTO: 88 FL (ref 82–98)
MV A" WAVE DURATION": 171.27 MS
MV PEAK A VEL: 0.7 M/S
MV PEAK E VEL: 0.63 M/S
NUCLEATED RBC (/100WBC) (OHS): 0 /100 WBC
OHS CV CPX PATIENT HEIGHT IN: 73
OHS CV RV/LV RATIO: 0.78 CM
PHOSPHATE SERPL-MCNC: 3.5 MG/DL (ref 2.7–4.5)
PISA TR MAX VEL: 2.5 M/S
PLATELET # BLD AUTO: 217 K/UL (ref 150–450)
PMV BLD AUTO: 9.7 FL (ref 9.2–12.9)
POTASSIUM SERPL-SCNC: 3.8 MMOL/L (ref 3.5–5.1)
PROT SERPL-MCNC: 6.4 GM/DL (ref 6–8.4)
PULM VEIN A" WAVE DURATION": 171.27 MS
PULM VEIN S/D RATIO: 1.77
PULMONIC VEIN PEAK A VELOCITY: 0.3 M/S
PV PEAK D VEL: 0.26 M/S
PV PEAK S VEL: 0.46 M/S
RA MAJOR: 3.87 CM
RA PRESSURE ESTIMATED: 3 MMHG
RA WIDTH: 3.33 CM
RBC # BLD AUTO: 4.97 M/UL (ref 4.6–6.2)
RELATIVE EOSINOPHIL (OHS): 3.6 %
RELATIVE LYMPHOCYTE (OHS): 23 % (ref 18–48)
RELATIVE MONOCYTE (OHS): 9.2 % (ref 4–15)
RELATIVE NEUTROPHIL (OHS): 61.1 % (ref 38–73)
RIGHT ATRIAL AREA: 11.3 CM2
RIGHT VENTRICLE DIASTOLIC BASEL DIMENSION: 3.2 CM
RV TB RVSP: 6 MMHG
RV TISSUE DOPPLER FREE WALL SYSTOLIC VELOCITY 1 (APICAL 4 CHAMBER VIEW): 10.05 CM/S
SINUS: 3.7 CM
SODIUM SERPL-SCNC: 138 MMOL/L (ref 136–145)
STJ: 3.1 CM
TDI LATERAL: 0.09 M/S
TDI SEPTAL: 0.09 M/S
TDI: 0.09 M/S
TRICUSPID ANNULAR PLANE SYSTOLIC EXCURSION: 2.2 CM
TV PEAK GRADIENT: 26 MMHG
TV REST PULMONARY ARTERY PRESSURE: 28 MMHG
WBC # BLD AUTO: 5.78 K/UL (ref 3.9–12.7)
Z-SCORE OF LEFT VENTRICULAR DIMENSION IN END DIASTOLE: -5.04
Z-SCORE OF LEFT VENTRICULAR DIMENSION IN END SYSTOLE: -3.38

## 2025-08-06 PROCEDURE — 63600175 PHARM REV CODE 636 W HCPCS: Performed by: STUDENT IN AN ORGANIZED HEALTH CARE EDUCATION/TRAINING PROGRAM

## 2025-08-06 PROCEDURE — 82565 ASSAY OF CREATININE: CPT | Performed by: STUDENT IN AN ORGANIZED HEALTH CARE EDUCATION/TRAINING PROGRAM

## 2025-08-06 PROCEDURE — 83735 ASSAY OF MAGNESIUM: CPT | Performed by: STUDENT IN AN ORGANIZED HEALTH CARE EDUCATION/TRAINING PROGRAM

## 2025-08-06 PROCEDURE — 84100 ASSAY OF PHOSPHORUS: CPT | Performed by: STUDENT IN AN ORGANIZED HEALTH CARE EDUCATION/TRAINING PROGRAM

## 2025-08-06 PROCEDURE — G0378 HOSPITAL OBSERVATION PER HR: HCPCS

## 2025-08-06 PROCEDURE — 85025 COMPLETE CBC W/AUTO DIFF WBC: CPT | Performed by: STUDENT IN AN ORGANIZED HEALTH CARE EDUCATION/TRAINING PROGRAM

## 2025-08-06 PROCEDURE — 25000003 PHARM REV CODE 250: Performed by: STUDENT IN AN ORGANIZED HEALTH CARE EDUCATION/TRAINING PROGRAM

## 2025-08-06 PROCEDURE — 36415 COLL VENOUS BLD VENIPUNCTURE: CPT | Performed by: STUDENT IN AN ORGANIZED HEALTH CARE EDUCATION/TRAINING PROGRAM

## 2025-08-06 RX ADMIN — VANCOMYCIN HYDROCHLORIDE 1000 MG: 1 INJECTION, POWDER, LYOPHILIZED, FOR SOLUTION INTRAVENOUS at 11:08

## 2025-08-06 RX ADMIN — CIPROFLOXACIN 500 MG: 500 TABLET ORAL at 08:08

## 2025-08-06 RX ADMIN — VANCOMYCIN HYDROCHLORIDE 1000 MG: 1 INJECTION, POWDER, LYOPHILIZED, FOR SOLUTION INTRAVENOUS at 12:08

## 2025-08-06 RX ADMIN — Medication 1 CAPSULE: at 08:08

## 2025-08-06 NOTE — ASSESSMENT & PLAN NOTE
69-year-old male with a past medical history of cervical spondylosis, chronic lower back pain status post lumbar spine surgery around 30 years ago who was recently admitted for a fall.  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  Patient was discharged on oral ciprofloxacin for UTI.  Blood cultures were negative at discharge.  Patient was called to return to the ED as blood cultures are now growing GPCs and GPRs.  Patient was started on vancomycin and Infectious Disease was consulted for bacteremia.     Urine culture with GNR, blood culture with staph epi, CONS, paenibacillus spp. Suspect this is containment. Patient remains without leukocytosis, without anemia.  CMP unremarkable.  2D echo completed without evidence of vegetation.    Patient is currently on vancomycin and ciprofloxacin.  ID was consulted for bacteremia.    Recommendations:  - Suspect this is contamination, cultures were obtained from the same site at the same time which increases risk of same organism in multiple bottles.  Repeat blood cultures are no growth.  2d echo negative.  Okay to monitor off of antibiotics.   - please re consult if repeat blood cultures are positive.   - pt with right sinus pain, reports chronic sinusitis, may benefit from further eval per PCP. Recommend dental eval.   - plan reviewed with ID staff. ID will sign off. Please re consult with concerns.

## 2025-08-06 NOTE — CONSULTS
[facility]  Infectious Disease  Consult Note    Patient Name: Marbin Cortes  MRN: [id number]  Admission Date: 8/5/2025  Hospital Length of Stay: 0 days  Attending Physician: Syeda Edmond MD  Primary Care Provider: No, Primary Doctor     Isolation Status: No active isolations    Patient information was obtained from patient, past medical records, and ER records.      Inpatient consult to Infectious Diseases  Consult performed by: Ashleigh Lafleur NP  Consult ordered by: Syeda Edmond MD        Assessment/Plan:     Renal/  UTI (urinary tract infection)  Urine with kleb pneumoniae. On cipro. Okay to continue for acute cystitis.     ID  * Positive blood culture  69-year-old male with a past medical history of cervical spondylosis, chronic lower back pain status post lumbar spine surgery around 30 years ago who was recently admitted for a fall.  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  Patient was discharged on oral ciprofloxacin for UTI.  Blood cultures were negative at discharge.  Patient was called to return to the ED as blood cultures are now growing GPCs and GPRs.  Patient was started on vancomycin and Infectious Disease was consulted for bacteremia.     Urine culture with GNR, blood culture with staph epi, CONS, paenibacillus spp. Suspect this is containment. Patient remains without leukocytosis, without anemia.  CMP unremarkable.  2D echo completed without evidence of vegetation.    Patient is currently on vancomycin and ciprofloxacin.  ID was consulted for bacteremia.    Recommendations:  - Suspect this is contamination, cultures were obtained from the same site at the same time which increases risk of same organism in multiple bottles.  Repeat blood cultures are no growth.  2d echo negative.  Okay to monitor off of antibiotics.   - please re consult if repeat blood cultures are positive.   -  pt with right sinus pain, reports chronic sinusitis, may benefit from further eval per PCP. Recommend dental eval.   - plan reviewed with ID staff. ID will sign off. Please re consult with concerns.         Thank you for your consult. I will sign off. Please contact us if you have any additional questions.    Ashleigh Finch NP  Infectious Disease  [facility]    Subjective:     Principal Problem: Positive blood culture    HPI: Mr. Cortes is a 69-year-old male with a past medical history of cervical spondylosis, chronic lower back pain status post lumbar spine surgery around 30 years ago who was recently admitted for a fall.  Patient was found to have hydronephrosis, urology followed and cleared him for outpatient follow up.  Patient was discharged on oral ciprofloxacin for UTI.  Blood cultures were negative at discharge.  Patient was called to return to the ED as blood cultures are now growing GPCs and GPRs.  Patient was started on vancomycin and Infectious Disease was consulted for bacteremia.     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  No wall thickening.  MRI of the lumbar spine notable for changes at L5-S1, benign osseous hemangiomas at L3 and L5. Schmorl's node at L4.  Severe left hydronephrosis and dilation of the left proximal ureter seen.    UA with pyuria, urine culture pending the positive for GNR is currently.  Previous blood cultures with GPCs resembling staph and GPRs in 1 set.  PCR positive for staph species, negative for MRSA gene, staph aureus, staph epi, staph lug.  Repeats no growth for 6 hours.  Patient remains without leukocytosis, without anemia.  CMP unremarkable.  2D echo completed without evidence of vegetation.    Patient is currently on vancomycin and ciprofloxacin.  ID was consulted for  "bacteremia.    Past Medical History:   Diagnosis Date    GERD (gastroesophageal reflux disease)        Past Surgical History:   Procedure Laterality Date    HERNIA REPAIR      SPINE SURGERY      UPPER GASTROINTESTINAL ENDOSCOPY         Review of patient's allergies indicates:  No Known Allergies    Medications:  Medications Prior to Admission   Medication Sig    acetaminophen (TYLENOL) 500 MG tablet Take 500 mg by mouth every 6 (six) hours as needed for Pain.    ciprofloxacin HCl (CIPRO) 500 MG tablet Take 1 tablet (500 mg total) by mouth 2 (two) times daily. for 10 days    ibuprofen (ADVIL,MOTRIN) 200 MG tablet Take 200 mg by mouth every 6 (six) hours as needed for Pain.    Lactobacillus rhamnosus GG (CULTURELLE) 10 billion cell capsule Take 1 capsule by mouth once daily. for 10 days    sodium chloride (SALINE NASAL) 0.65 % nasal spray 2 sprays by Nasal route once daily.     Antibiotics (From admission, onward)      Start     Stop Route Frequency Ordered    08/06/25 0030  vancomycin (VANCOCIN) 1,000 mg in D5W 250 mL IVPB (admixture device)         -- IV Every 12 hours (non-standard times) 08/05/25 1347    08/05/25 2100  ciprofloxacin HCl tablet 500 mg         -- Oral 2 times daily 08/05/25 1302    08/05/25 1401  vancomycin - pharmacy to dose  (vancomycin IVPB (PEDS and ADULTS))        Placed in "And" Linked Group    -- IV pharmacy to manage frequency 08/05/25 1301          Antifungals (From admission, onward)      None          Antivirals (From admission, onward)      None               There is no immunization history on file for this patient.    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   Tobacco " Use    Smoking status: Never    Smokeless tobacco: Never   Vaping Use    Vaping status: Never Used   Substance and Sexual Activity    Alcohol use: No    Drug use: No     Social Drivers of Health     Financial Resource Strain: Low Risk  (8/5/2025)    Overall Financial Resource Strain (CARDIA)     Difficulty of Paying Living Expenses: Not hard at all   Food Insecurity: No Food Insecurity (8/5/2025)    Hunger Vital Sign     Worried About Running Out of Food in the Last Year: Never true     Ran Out of Food in the Last Year: Never true   Transportation Needs: No Transportation Needs (8/5/2025)    PRAPARE - Transportation     Lack of Transportation (Medical): No     Lack of Transportation (Non-Medical): No   Physical Activity: Sufficiently Active (8/4/2025)    Exercise Vital Sign     Days of Exercise per Week: 7 days     Minutes of Exercise per Session: 30 min   Stress: No Stress Concern Present (8/5/2025)    [employer] - Occupational Stress Questionnaire     Feeling of Stress : Not at all   Housing Stability: Low Risk  (8/5/2025)    Housing Stability Vital Sign     Unable to Pay for Housing in the Last Year: No     Homeless in the Last Year: No     Review of Systems   Constitutional:  Negative for chills, diaphoresis, fatigue and fever.   HENT:  Positive for dental problem and sinus pain (right).    Respiratory:  Negative for cough and shortness of breath.    Gastrointestinal:  Negative for constipation, diarrhea, nausea and vomiting.   Genitourinary:  Negative for dysuria.   Musculoskeletal:  Negative for arthralgias and myalgias.   Skin:  Negative for rash and wound.   Neurological:  Negative for dizziness and weakness.   Psychiatric/Behavioral:  Negative for agitation and confusion.      Objective:     Vital Signs (Most Recent):  Temp: 98 °F (36.7 °C) (08/06/25 1150)  Pulse: 76 (08/06/25 1158)  Resp: 18 (08/06/25 1150)  BP: (!) 164/82 (08/06/25 1150)  SpO2: 98 % (08/06/25 1150) Vital  Signs (24h Range):  Temp:  [97.6 °F (36.4 °C)-98.5 °F (36.9 °C)] 98 °F (36.7 °C)  Pulse:  [63-76] 76  Resp:  [17-20] 18  SpO2:  [95 %-98 %] 98 %  BP: (111-164)/(65-82) 164/82     Weight: 88.1 kg (194 lb 3.6 oz)  Body mass index is 25.62 kg/m².    Estimated Creatinine Clearance: 79.9 mL/min (based on SCr of 1 mg/dL).     Physical Exam  Vitals reviewed.   Constitutional:       General: He is not in acute distress.     Appearance: Normal appearance. He is not ill-appearing.   HENT:      Nose: Nose normal.      Mouth/Throat:      Mouth: Mucous membranes are moist.      Pharynx: Oropharynx is clear.   Eyes:      General:         Right eye: No discharge.         Left eye: No discharge.      Conjunctiva/sclera: Conjunctivae normal.   Cardiovascular:      Rate and Rhythm: Normal rate.   Pulmonary:      Effort: Pulmonary effort is normal. No respiratory distress.      Breath sounds: No stridor.   Abdominal:      General: Abdomen is flat. There is no distension.      Palpations: There is no mass.   Musculoskeletal:         General: Normal range of motion.      Cervical back: Normal range of motion.   Skin:     General: Skin is warm.      Findings: No lesion or rash.   Neurological:      General: No focal deficit present.      Mental Status: He is alert and oriented to person, place, and time.      Motor: No weakness.      Gait: Gait normal.   Psychiatric:         Mood and Affect: Mood normal.         Behavior: Behavior normal.          Significant Labs: All pertinent labs within the past 24 hours have been reviewed.    Significant Imaging: I have reviewed all pertinent imaging results/findings within the past 24 hours.

## 2025-08-06 NOTE — PROGRESS NOTES
VANCOMYCIN DOSING BY PHARMACY DISCONTINUATION NOTE    Marbin Cortes is a 69 y.o. male who had been consulted for vancomycin dosing.    The pharmacy consult for vancomycin dosing has been discontinued.     Vancomycin Dosing by Pharmacy Consult will sign-off. Please reconsult if necessary. Thank you for allowing us to participate in this patient's care.     Carlita Mensah, PharmD  [id number]

## 2025-08-06 NOTE — PLAN OF CARE
Problem: Hospitalized Older Adult  Goal: Optimal Cognitive Function  Outcome: Progressing  Goal: Effective Bowel Elimination  Outcome: Progressing  Goal: Optimal Coping  Outcome: Progressing  Goal: Fluid and Electrolyte Balance  Outcome: Progressing  Goal: Optimal Functional Ability  Outcome: Progressing  Goal: Improved Oral Intake  Outcome: Progressing  Goal: Adequate Sleep/Rest  Outcome: Progressing  Goal: Effective Urinary Elimination  Outcome: Progressing     Problem: Adult Inpatient Plan of Care  Goal: Plan of Care Review  Outcome: Progressing  Goal: Patient-Specific Goal (Individualized)  Outcome: Progressing  Goal: Absence of Hospital-Acquired Illness or Injury  Outcome: Progressing  Goal: Optimal Comfort and Wellbeing  Outcome: Progressing  Goal: Readiness for Transition of Care  Outcome: Progressing     Problem: Infection  Goal: Absence of Infection Signs and Symptoms  Outcome: Progressing     Problem: Fall Injury Risk  Goal: Absence of Fall and Fall-Related Injury  Outcome: Progressing     Problem: Infection  Goal: Absence of Infection Signs and Symptoms  Outcome: Progressing     Problem: Adult Inpatient Plan of Care  Goal: Absence of Hospital-Acquired Illness or Injury  Outcome: Progressing

## 2025-08-06 NOTE — PLAN OF CARE
[facility]  Discharge Assessment    Primary Care Provider: No, Primary Doctor     Discharge Assessment (most recent)       BRIEF DISCHARGE ASSESSMENT - 08/06/25 8410          Discharge Planning    Assessment Type Final Discharge Note     Resource/Environmental Concerns none     Support Systems Spouse/significant other     Equipment Currently Used at Home cane, straight     Current Living Arrangements home     Patient/Family Anticipates Transition to home;home with family     Patient/Family Anticipated Services at Transition none     DME Needed Upon Discharge  none     Discharge Plan A Home     Discharge Plan B Home                   Pt is a 69 y.o. male admitted with positive blood culture and has a PMH of cervical spondylosis. He lives with his wife in a singles tory house with 4 steps to enter. He is independent with his ADLs and iADLs. He will have transportation home. [facility] given to patient and/or family with understanding verbalized.   name and number and estimated discharge date written on white board in patient's room with request to call for any questions or concerns.  Will continue to follow for needs.  Discharge Plan A and Plan B have been determined by review of patient's clinical status, future medical and therapeutic needs, and coverage/benefits for post-acute care in coordination with multidisciplinary team members.  Wilfred Kaiser RN,BSN

## 2025-08-06 NOTE — SUBJECTIVE & OBJECTIVE
"Past Medical History:   Diagnosis Date    GERD (gastroesophageal reflux disease)        Past Surgical History:   Procedure Laterality Date    HERNIA REPAIR      SPINE SURGERY      UPPER GASTROINTESTINAL ENDOSCOPY         Review of patient's allergies indicates:  No Known Allergies    Medications:  Medications Prior to Admission   Medication Sig    acetaminophen (TYLENOL) 500 MG tablet Take 500 mg by mouth every 6 (six) hours as needed for Pain.    ciprofloxacin HCl (CIPRO) 500 MG tablet Take 1 tablet (500 mg total) by mouth 2 (two) times daily. for 10 days    ibuprofen (ADVIL,MOTRIN) 200 MG tablet Take 200 mg by mouth every 6 (six) hours as needed for Pain.    Lactobacillus rhamnosus GG (CULTURELLE) 10 billion cell capsule Take 1 capsule by mouth once daily. for 10 days    sodium chloride (SALINE NASAL) 0.65 % nasal spray 2 sprays by Nasal route once daily.     Antibiotics (From admission, onward)      Start     Stop Route Frequency Ordered    08/06/25 0030  vancomycin (VANCOCIN) 1,000 mg in D5W 250 mL IVPB (admixture device)         -- IV Every 12 hours (non-standard times) 08/05/25 1347    08/05/25 2100  ciprofloxacin HCl tablet 500 mg         -- Oral 2 times daily 08/05/25 1302    08/05/25 1401  vancomycin - pharmacy to dose  (vancomycin IVPB (PEDS and ADULTS))        Placed in "And" Linked Group    -- IV pharmacy to manage frequency 08/05/25 1301          Antifungals (From admission, onward)      None          Antivirals (From admission, onward)      None               There is no immunization history on file for this patient.    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   Tobacco Use    Smoking " status: Never    Smokeless tobacco: Never   Vaping Use    Vaping status: Never Used   Substance and Sexual Activity    Alcohol use: No    Drug use: No     Social Drivers of Health     Financial Resource Strain: Low Risk  (8/5/2025)    Overall Financial Resource Strain (CARDIA)     Difficulty of Paying Living Expenses: Not hard at all   Food Insecurity: No Food Insecurity (8/5/2025)    Hunger Vital Sign     Worried About Running Out of Food in the Last Year: Never true     Ran Out of Food in the Last Year: Never true   Transportation Needs: No Transportation Needs (8/5/2025)    PRAPARE - Transportation     Lack of Transportation (Medical): No     Lack of Transportation (Non-Medical): No   Physical Activity: Sufficiently Active (8/4/2025)    Exercise Vital Sign     Days of Exercise per Week: 7 days     Minutes of Exercise per Session: 30 min   Stress: No Stress Concern Present (8/5/2025)    [employer] - Occupational Stress Questionnaire     Feeling of Stress : Not at all   Housing Stability: Low Risk  (8/5/2025)    Housing Stability Vital Sign     Unable to Pay for Housing in the Last Year: No     Homeless in the Last Year: No     Review of Systems   Constitutional:  Negative for chills, diaphoresis, fatigue and fever.   HENT:  Positive for dental problem and sinus pain (right).    Respiratory:  Negative for cough and shortness of breath.    Gastrointestinal:  Negative for constipation, diarrhea, nausea and vomiting.   Genitourinary:  Negative for dysuria.   Musculoskeletal:  Negative for arthralgias and myalgias.   Skin:  Negative for rash and wound.   Neurological:  Negative for dizziness and weakness.   Psychiatric/Behavioral:  Negative for agitation and confusion.      Objective:     Vital Signs (Most Recent):  Temp: 98 °F (36.7 °C) (08/06/25 1150)  Pulse: 76 (08/06/25 1158)  Resp: 18 (08/06/25 1150)  BP: (!) 164/82 (08/06/25 1150)  SpO2: 98 % (08/06/25 1150) Vital Signs (24h  Range):  Temp:  [97.6 °F (36.4 °C)-98.5 °F (36.9 °C)] 98 °F (36.7 °C)  Pulse:  [63-76] 76  Resp:  [17-20] 18  SpO2:  [95 %-98 %] 98 %  BP: (111-164)/(65-82) 164/82     Weight: 88.1 kg (194 lb 3.6 oz)  Body mass index is 25.62 kg/m².    Estimated Creatinine Clearance: 79.9 mL/min (based on SCr of 1 mg/dL).     Physical Exam  Vitals reviewed.   Constitutional:       General: He is not in acute distress.     Appearance: Normal appearance. He is not ill-appearing.   HENT:      Nose: Nose normal.      Mouth/Throat:      Mouth: Mucous membranes are moist.      Pharynx: Oropharynx is clear.   Eyes:      General:         Right eye: No discharge.         Left eye: No discharge.      Conjunctiva/sclera: Conjunctivae normal.   Cardiovascular:      Rate and Rhythm: Normal rate.   Pulmonary:      Effort: Pulmonary effort is normal. No respiratory distress.      Breath sounds: No stridor.   Abdominal:      General: Abdomen is flat. There is no distension.      Palpations: There is no mass.   Musculoskeletal:         General: Normal range of motion.      Cervical back: Normal range of motion.   Skin:     General: Skin is warm.      Findings: No lesion or rash.   Neurological:      General: No focal deficit present.      Mental Status: He is alert and oriented to person, place, and time.      Motor: No weakness.      Gait: Gait normal.   Psychiatric:         Mood and Affect: Mood normal.         Behavior: Behavior normal.          Significant Labs: All pertinent labs within the past 24 hours have been reviewed.    Significant Imaging: I have reviewed all pertinent imaging results/findings within the past 24 hours.

## 2025-08-06 NOTE — HPI
Mr. Cortes is a 69-year-old male with a past medical history of cervical spondylosis, chronic lower back pain status post lumbar spine surgery around 30 years ago who was recently admitted for a fall.  Patient was found to have hydronephrosis, urology followed and cleared him for outpatient follow up.  Patient was discharged on oral ciprofloxacin for UTI.  Blood cultures were negative at discharge.  Patient was called to return to the ED as blood cultures are now growing GPCs and GPRs.  Patient was started on vancomycin and Infectious Disease was consulted for bacteremia.     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  No wall thickening.  MRI of the lumbar spine notable for changes at L5-S1, benign osseous hemangiomas at L3 and L5. Schmorl's node at L4.  Severe left hydronephrosis and dilation of the left proximal ureter seen.    UA with pyuria, urine culture pending the positive for GNR is currently.  Previous blood cultures with GPCs resembling staph and GPRs in 1 set.  PCR positive for staph species, negative for MRSA gene, staph aureus, staph epi, staph lug.  Repeats no growth for 6 hours.  Patient remains without leukocytosis, without anemia.  CMP unremarkable.  2D echo completed without evidence of vegetation.    Patient is currently on vancomycin and ciprofloxacin.  ID was consulted for bacteremia.

## 2025-08-07 ENCOUNTER — DOCUMENTATION ONLY (OUTPATIENT)
Dept: UROLOGY | Facility: CLINIC | Age: 70
End: 2025-08-07
Payer: MEDICARE

## 2025-08-07 ENCOUNTER — PATIENT MESSAGE (OUTPATIENT)
Dept: UROLOGY | Facility: CLINIC | Age: 70
End: 2025-08-07
Payer: MEDICARE

## 2025-08-07 LAB
BACTERIA BLD CULT: ABNORMAL
GRAM STN SPEC: ABNORMAL

## 2025-08-07 NOTE — ASSESSMENT & PLAN NOTE
- Bcx from previous admit staph epi and CONS- likely contaminant   - possible contaminant but both bottles+- incorrectly drawn from same site  - repeat Bcx- NG 24h  - stop vanc  - ECHO wnl  - ID consulted and agree with likely contamination

## 2025-08-07 NOTE — PROGRESS NOTES
Contacted [facility] to get patient scheduled for Renal scan, she reached out to [place] office to get him scheduled since patient preference is [facility]. Will schedule follow up once patient is in with [facility].

## 2025-08-07 NOTE — HOSPITAL COURSE
Admitted for bacteremia r/o. Repeat blood cultures drawn, started on vanc. Previous cultures +staph epi and CONS- likely contaminant. Repeat Bcx NG 24h. ECHO wnl. ID consulted and agree. Stable for discharge. Cont cipro for Klebsiella UTI as previously prescribed and f/u with urology for hydronephrosis.   Patient seen and evaluated on day of discharge and deemed appropriate for discharge. Plan discussed with pt, who was agreeable and amenable; medications were discussed and reviewed, outpatient follow-up scheduled, ER precautions were given, all questions were answered to the pt's satisfaction, and patient was subsequently discharged.

## 2025-08-07 NOTE — DISCHARGE SUMMARY
[facility] [address] [place] [address] Medicine  Discharge Summary      Patient Name: Marbin Cortes  MRN: [id number]  ROBIN: [id number]  Patient Class: OP- Observation  Admission Date: 8/5/2025  Hospital Length of Stay: 0 days  Discharge Date and Time: 08/06/2025 7:16 PM  Attending Physician: Syeda Edmond MD   Discharging Provider: Syeda Edmond MD  Primary Care Provider: Denise, Primary Doctor  [facility] Medicine Team: [facility] Syeda Edmond MD  Primary Care Team: [facility]    HPI:   69 y.o. with pmh of cervical spondylosis and chronic lower back pain s/p lumbar spine surgery ~30 years ago recently admitted for fall. Found to have hydronephrosis on imaging, urology cleared for outpt f/u, discharged on oral cipro for UTI. Bcx were negative at discharge, clinically stable without signs of sepsis. Overnight Bcx+ GPC and GPR. Called patient to return for admission for bacteremia r/o.     * No surgery found *      Hospital Course:   Admitted for bacteremia r/o. Repeat blood cultures drawn, started on vanc. Previous cultures +staph epi and CONS- likely contaminant. Repeat Bcx NG 24h. ECHO wnl. ID consulted and agree. Stable for discharge. Cont cipro for Klebsiella UTI as previously prescribed and f/u with urology for hydronephrosis.   Patient seen and evaluated on day of discharge and deemed appropriate for discharge. Plan discussed with pt, who was agreeable and amenable; medications were discussed and reviewed, outpatient follow-up scheduled, ER precautions were given, all questions were answered to the pt's satisfaction, and patient was subsequently discharged.       Goals of Care Treatment Preferences:  Code Status: Full Code         Consults:   Consults (From admission, onward)          Status Ordering Provider     Inpatient consult to Infectious Diseases  Once        Provider:  (Not yet assigned)    Completed SYEDA EDMOND            Assessment & Plan  Positive blood culture  - Bcx from previous admit  staph epi and CONS- likely contaminant   - possible contaminant but both bottles+- incorrectly drawn from same site  - repeat Bcx- NG 24h  - stop vanc  - ECHO wnl  - ID consulted and agree with likely contamination     UTI (urinary tract infection)  - prior Ucx Klebsiella  - cont cipro    Hydronephrosis  Ureteropelvic junction (UPJ) obstruction, left  - previously noted on imaging  - f/u with urology as previously scheduled  - renal function stable         Final Active Diagnoses:    Diagnosis Date Noted POA    PRINCIPAL PROBLEM:  Positive blood culture [R78.81] 08/05/2025 Yes    Ureteropelvic junction (UPJ) obstruction, left [N13.5] 08/04/2025 Yes    Hydronephrosis [N13.30] 08/03/2025 Yes    UTI (urinary tract infection) [N39.0] 08/03/2025 Yes      Problems Resolved During this Admission:       Discharged Condition: stable    Disposition: Home or Self Care    Follow Up:   Follow-up Information       No, Primary Doctor. Schedule an appointment as soon as possible for a visit in 1 week(s).                           Patient Instructions:      Diet Adult Regular     Notify your health care provider if you experience any of the following:  temperature >100.4     Notify your health care provider if you experience any of the following:  severe uncontrolled pain     Notify your health care provider if you experience any of the following:  increased confusion or weakness     Notify your health care provider if you experience any of the following:  redness, tenderness, or signs of infection (pain, swelling, redness, odor or green/yellow discharge around incision site)     Activity as tolerated       Significant Diagnostic Studies: Labs: All labs within the past 24 hours have been reviewed    Pending Diagnostic Studies:       None           Medications:  Reconciled Home Medications:      Medication List        CONTINUE taking these medications      acetaminophen 500 MG tablet  Commonly known as: TYLENOL  Take 500 mg by mouth  every 6 (six) hours as needed for Pain.     ciprofloxacin HCl 500 MG tablet  Commonly known as: CIPRO  Take 1 tablet (500 mg total) by mouth 2 (two) times daily. for 10 days     ibuprofen 200 MG tablet  Commonly known as: ADVIL,MOTRIN  Take 200 mg by mouth every 6 (six) hours as needed for Pain.     Lactobacillus rhamnosus GG 10 billion cell capsule  Commonly known as: CULTURELLE  Take 1 capsule by mouth once daily. for 10 days     Saline NasaL 0.65 % nasal spray  Generic drug: sodium chloride  2 sprays by Nasal route once daily.              Indwelling Lines/Drains at time of discharge:   Lines/Drains/Airways       None                       Time spent on the discharge of patient: 40 minutes         Syeda Edmond MD  Department of Hospital Medicine  [facility] - Observation 11H   stretcher

## 2025-08-07 NOTE — PLAN OF CARE
[facility]  Discharge Final Note    Primary Care Provider: No, Primary Doctor    Expected Discharge Date: 8/6/2025    Final Discharge Note (most recent)       Final Note - 08/07/25 0834          Final Note    Assessment Type Final Discharge Note     Anticipated Discharge Disposition Home-Health Care [facility] [place]     Hospital Resources/Appts/Education Provided Provided patient/caregiver with written discharge plan information;Provided education on problems/symptoms using teachback;Appointments scheduled and added to AVS        Post-Acute Status    Post-Acute Authorization Home Health     Home Health Status Set-up Complete/Auth obtained     Discharge Delays None known at this time                   Pt discharged home with resumption of Home Health with [facility].   Future Appointments   Date Time Provider Department Center   8/13/2025  2:00 PM Altaf Phillips MD [facility] UROLOGY [facility] RN,BSN      Important Message from Medicare             Contact Info       No, Primary Doctor   Relationship: PCP - General        Next Steps: Schedule an appointment as soon as possible for a visit in 1 week(s)          [facility]  Discharge Final Note    Primary Care Provider: No, Primary Doctor    Expected Discharge Date: 8/6/2025    Final Discharge Note (most recent)       Final Note - 08/07/25 0834          Final Note    Assessment Type Final Discharge Note     Anticipated Discharge Disposition Home-[facility] [place]     Hospital Resources/Appts/Education Provided Provided patient/caregiver with written discharge plan information;Provided education on problems/symptoms using teachback;Appointments scheduled and added to AVS        Post-Acute Status    Post-Acute Authorization Home Health     Home Health Status Set-up Complete/Auth obtained     Discharge Delays None known at this time                     Important Message from Medicare             Contact Info        No, Primary Doctor   Relationship: PCP - General        Next Steps: Schedule an appointment as soon as possible for a visit in 1 week(s)

## 2025-08-09 LAB
BACTERIA BLD CULT: NORMAL
BACTERIA BLD CULT: NORMAL

## 2025-08-13 ENCOUNTER — PATIENT MESSAGE (OUTPATIENT)
Dept: UROLOGY | Facility: CLINIC | Age: 70
End: 2025-08-13
Payer: MEDICARE

## 2025-08-13 ENCOUNTER — OFFICE VISIT (OUTPATIENT)
Dept: UROLOGY | Facility: CLINIC | Age: 70
End: 2025-08-13
Payer: MEDICARE

## 2025-08-13 ENCOUNTER — PATIENT MESSAGE (OUTPATIENT)
Dept: UROLOGY | Facility: CLINIC | Age: 70
End: 2025-08-13

## 2025-08-13 ENCOUNTER — HOSPITAL ENCOUNTER (OUTPATIENT)
Dept: RADIOLOGY | Facility: HOSPITAL | Age: 70
Discharge: HOME | End: 2025-08-13
Payer: MEDICARE

## 2025-08-13 VITALS
DIASTOLIC BLOOD PRESSURE: 103 MMHG | SYSTOLIC BLOOD PRESSURE: 150 MMHG | WEIGHT: 194 LBS | HEIGHT: 73 IN | HEART RATE: 129 BPM | BODY MASS INDEX: 25.71 KG/M2

## 2025-08-13 DIAGNOSIS — N13.5 URETEROPELVIC JUNCTION (UPJ) OBSTRUCTION, LEFT: ICD-10-CM

## 2025-08-13 DIAGNOSIS — N32.81 OAB (OVERACTIVE BLADDER): ICD-10-CM

## 2025-08-13 DIAGNOSIS — T83.511A URINARY TRACT INFECTION ASSOCIATED WITH CATHETERIZATION OF URINARY TRACT, UNSPECIFIED INDWELLING URINARY CATHETER TYPE, INITIAL ENCOUNTER: ICD-10-CM

## 2025-08-13 DIAGNOSIS — Z12.5 SCREENING PSA (PROSTATE SPECIFIC ANTIGEN): ICD-10-CM

## 2025-08-13 DIAGNOSIS — N13.5 URETEROPELVIC JUNCTION (UPJ) OBSTRUCTION, LEFT: Primary | ICD-10-CM

## 2025-08-13 DIAGNOSIS — N13.30 HYDRONEPHROSIS, UNSPECIFIED HYDRONEPHROSIS TYPE: Primary | ICD-10-CM

## 2025-08-13 DIAGNOSIS — N39.0 URINARY TRACT INFECTION ASSOCIATED WITH CATHETERIZATION OF URINARY TRACT, UNSPECIFIED INDWELLING URINARY CATHETER TYPE, INITIAL ENCOUNTER: ICD-10-CM

## 2025-08-13 PROCEDURE — 99205 OFFICE O/P NEW HI 60 MIN: CPT | Mod: S$GLB,,, | Performed by: STUDENT IN AN ORGANIZED HEALTH CARE EDUCATION/TRAINING PROGRAM

## 2025-08-13 PROCEDURE — 3077F SYST BP >= 140 MM HG: CPT | Mod: CPTII,S$GLB,, | Performed by: STUDENT IN AN ORGANIZED HEALTH CARE EDUCATION/TRAINING PROGRAM

## 2025-08-13 PROCEDURE — 99999 PR PBB SHADOW E&M-EST. PATIENT-LVL III: CPT | Mod: PBBFAC,,, | Performed by: STUDENT IN AN ORGANIZED HEALTH CARE EDUCATION/TRAINING PROGRAM

## 2025-08-13 PROCEDURE — 1101F PT FALLS ASSESS-DOCD LE1/YR: CPT | Mod: CPTII,S$GLB,, | Performed by: STUDENT IN AN ORGANIZED HEALTH CARE EDUCATION/TRAINING PROGRAM

## 2025-08-13 PROCEDURE — 51741 ELECTRO-UROFLOWMETRY FIRST: CPT | Mod: S$GLB,,, | Performed by: STUDENT IN AN ORGANIZED HEALTH CARE EDUCATION/TRAINING PROGRAM

## 2025-08-13 PROCEDURE — 3008F BODY MASS INDEX DOCD: CPT | Mod: CPTII,S$GLB,, | Performed by: STUDENT IN AN ORGANIZED HEALTH CARE EDUCATION/TRAINING PROGRAM

## 2025-08-13 PROCEDURE — 3080F DIAST BP >= 90 MM HG: CPT | Mod: CPTII,S$GLB,, | Performed by: STUDENT IN AN ORGANIZED HEALTH CARE EDUCATION/TRAINING PROGRAM

## 2025-08-13 PROCEDURE — 1126F AMNT PAIN NOTED NONE PRSNT: CPT | Mod: CPTII,S$GLB,, | Performed by: STUDENT IN AN ORGANIZED HEALTH CARE EDUCATION/TRAINING PROGRAM

## 2025-08-13 PROCEDURE — 3288F FALL RISK ASSESSMENT DOCD: CPT | Mod: CPTII,S$GLB,, | Performed by: STUDENT IN AN ORGANIZED HEALTH CARE EDUCATION/TRAINING PROGRAM

## 2025-08-13 PROCEDURE — 78708 K FLOW/FUNCT IMAGE W/DRUG: CPT | Mod: 26,,, | Performed by: NUCLEAR MEDICINE

## 2025-08-13 PROCEDURE — 78708 K FLOW/FUNCT IMAGE W/DRUG: CPT | Mod: TC

## 2025-08-13 PROCEDURE — 63600175 PHARM REV CODE 636 W HCPCS

## 2025-08-13 PROCEDURE — A9562 TC99M MERTIATIDE: HCPCS

## 2025-08-13 RX ORDER — FUROSEMIDE 10 MG/ML
40 INJECTION INTRAMUSCULAR; INTRAVENOUS ONCE
Status: COMPLETED | OUTPATIENT
Start: 2025-08-13 | End: 2025-08-13

## 2025-08-13 RX ORDER — BETIATIDE 1 MG/1
5.14 INJECTION, POWDER, LYOPHILIZED, FOR SOLUTION INTRAVENOUS
Status: COMPLETED | OUTPATIENT
Start: 2025-08-13 | End: 2025-08-13

## 2025-08-13 RX ADMIN — TECHNESCAN TC 99M MERTIATIDE 5.14 MILLICURIE: 1 INJECTION, POWDER, LYOPHILIZED, FOR SOLUTION INTRAVENOUS at 09:08

## 2025-08-13 RX ADMIN — FUROSEMIDE 40 MG: 10 INJECTION, SOLUTION INTRAVENOUS at 09:08

## 2025-08-16 PROBLEM — N32.81 OAB (OVERACTIVE BLADDER): Status: ACTIVE | Noted: 2025-08-16

## 2025-08-26 RX ORDER — VIBEGRON 75 MG/1
75 TABLET, FILM COATED ORAL DAILY
Qty: 30 TABLET | Refills: 11 | Status: SHIPPED | OUTPATIENT
Start: 2025-08-26 | End: 2026-08-26

## 2025-09-02 ENCOUNTER — TELEPHONE (OUTPATIENT)
Dept: UROLOGY | Facility: CLINIC | Age: 70
End: 2025-09-02
Payer: MEDICARE

## 2025-09-02 RX ORDER — VIBEGRON 75 MG/1
75 TABLET, FILM COATED ORAL DAILY
Qty: 30 TABLET | Refills: 11 | Status: SHIPPED | OUTPATIENT
Start: 2025-09-02 | End: 2026-09-02